# Patient Record
Sex: FEMALE | Race: WHITE | NOT HISPANIC OR LATINO | Employment: FULL TIME | URBAN - METROPOLITAN AREA
[De-identification: names, ages, dates, MRNs, and addresses within clinical notes are randomized per-mention and may not be internally consistent; named-entity substitution may affect disease eponyms.]

---

## 2023-07-20 ENCOUNTER — APPOINTMENT (OUTPATIENT)
Dept: ULTRASOUND IMAGING | Facility: HOSPITAL | Age: 48
DRG: 744 | End: 2023-07-20

## 2023-07-20 ENCOUNTER — APPOINTMENT (OUTPATIENT)
Dept: CT IMAGING | Facility: HOSPITAL | Age: 48
DRG: 744 | End: 2023-07-20

## 2023-07-20 ENCOUNTER — HOSPITAL ENCOUNTER (INPATIENT)
Facility: HOSPITAL | Age: 48
LOS: 4 days | Discharge: HOME | DRG: 744 | End: 2023-07-24
Attending: EMERGENCY MEDICINE | Admitting: STUDENT IN AN ORGANIZED HEALTH CARE EDUCATION/TRAINING PROGRAM

## 2023-07-20 ENCOUNTER — APPOINTMENT (OUTPATIENT)
Dept: GENERAL RADIOLOGY | Facility: HOSPITAL | Age: 48
DRG: 744 | End: 2023-07-20

## 2023-07-20 DIAGNOSIS — D25.9 UTERINE LEIOMYOMA, UNSPECIFIED LOCATION: ICD-10-CM

## 2023-07-20 DIAGNOSIS — E87.20 METABOLIC ACIDOSIS: ICD-10-CM

## 2023-07-20 DIAGNOSIS — N93.8 DYSFUNCTIONAL UTERINE BLEEDING: Primary | ICD-10-CM

## 2023-07-20 DIAGNOSIS — D64.9 SYMPTOMATIC ANEMIA: ICD-10-CM

## 2023-07-20 DIAGNOSIS — F17.200 TOBACCO DEPENDENCE: ICD-10-CM

## 2023-07-20 DIAGNOSIS — N93.9 VAGINAL BLEEDING: ICD-10-CM

## 2023-07-20 DIAGNOSIS — N17.9 ACUTE RENAL FAILURE, UNSPECIFIED ACUTE RENAL FAILURE TYPE: ICD-10-CM

## 2023-07-20 LAB
ABO GROUP BLD: NORMAL
ABO GROUP BLD: NORMAL
ALBUMIN SERPL-MCNC: 3.3 G/DL (ref 3.5–5.2)
ALBUMIN/GLOB SERPL: 1.4 G/DL
ALP SERPL-CCNC: 38 U/L (ref 39–117)
ALT SERPL W P-5'-P-CCNC: 11 U/L (ref 1–33)
ANION GAP SERPL CALCULATED.3IONS-SCNC: 16 MMOL/L (ref 5–15)
ANISOCYTOSIS BLD QL: NORMAL
ARTERIAL PATENCY WRIST A: ABNORMAL
AST SERPL-CCNC: 10 U/L (ref 1–32)
ATMOSPHERIC PRESS: ABNORMAL MM[HG]
BASE EXCESS BLDA CALC-SCNC: -8.2 MMOL/L (ref 0–2)
BASOPHILS # BLD AUTO: 0.01 10*3/MM3 (ref 0–0.2)
BASOPHILS NFR BLD AUTO: 0.1 % (ref 0–1.5)
BDY SITE: ABNORMAL
BILIRUB SERPL-MCNC: <0.2 MG/DL (ref 0–1.2)
BLD GP AB SCN SERPL QL: NEGATIVE
BODY TEMPERATURE: 37 C
BUN BLDA-MCNC: 37 MG/DL
BUN SERPL-MCNC: 32 MG/DL (ref 6–20)
BUN/CREAT SERPL: 7.5 (ref 7–25)
CALCIUM BLD QL: 1.15 MG/DL
CALCIUM SPEC-SCNC: 8.3 MG/DL (ref 8.6–10.5)
CHLORIDE BLDA-SCNC: 107 MMOL/L (ref 98–109)
CHLORIDE SERPL-SCNC: 105 MMOL/L (ref 98–107)
CO2 BLDA-SCNC: 17.5 MMOL/L (ref 22–33)
CO2 SERPL-SCNC: 15 MMOL/L (ref 22–29)
COHGB MFR BLD: 2.6 % (ref 0–2)
CREAT BLDA-MCNC: 5.3 MG/DL
CREAT SERPL-MCNC: 4.26 MG/DL (ref 0.57–1)
DEPRECATED RDW RBC AUTO: 65.6 FL (ref 37–54)
EGFRCR SERPLBLD CKD-EPI 2021: 12.2 ML/MIN/1.73
EOSINOPHIL # BLD AUTO: 0.21 10*3/MM3 (ref 0–0.4)
EOSINOPHIL NFR BLD AUTO: 2 % (ref 0.3–6.2)
EPAP: 0
ERYTHROCYTE [DISTWIDTH] IN BLOOD BY AUTOMATED COUNT: 16.6 % (ref 12.3–15.4)
GLOBULIN UR ELPH-MCNC: 2.3 GM/DL
GLUCOSE BLDC GLUCOMTR-MCNC: 138 MG/DL (ref 70–130)
GLUCOSE SERPL-MCNC: 138 MG/DL (ref 65–99)
HCO3 BLDA-SCNC: 16.6 MMOL/L (ref 20–26)
HCT VFR BLD AUTO: 13.8 % (ref 34–46.6)
HCT VFR BLD CALC: 15.4 % (ref 38–51)
HCT VFR BLDA CALC: -15 % (ref 38–51)
HGB BLD-MCNC: 4.3 G/DL (ref 12–15.9)
HGB BLDA-MCNC: 5 G/DL (ref 14–18)
IMM GRANULOCYTES # BLD AUTO: 0.09 10*3/MM3 (ref 0–0.05)
IMM GRANULOCYTES NFR BLD AUTO: 0.9 % (ref 0–0.5)
INHALED O2 CONCENTRATION: 21 %
INR PPP: 1.11 (ref 0.89–1.12)
IPAP: 0
LIPASE SERPL-CCNC: 59 U/L (ref 13–60)
LYMPHOCYTES # BLD AUTO: 2.21 10*3/MM3 (ref 0.7–3.1)
LYMPHOCYTES NFR BLD AUTO: 21.4 % (ref 19.6–45.3)
Lab: ABNORMAL
MCH RBC QN AUTO: 35 PG (ref 26.6–33)
MCHC RBC AUTO-ENTMCNC: 31.2 G/DL (ref 31.5–35.7)
MCV RBC AUTO: 112.2 FL (ref 79–97)
METHGB BLD QL: ABNORMAL
MODALITY: ABNORMAL
MONOCYTES # BLD AUTO: 0.63 10*3/MM3 (ref 0.1–0.9)
MONOCYTES NFR BLD AUTO: 6.1 % (ref 5–12)
NEUTROPHILS NFR BLD AUTO: 69.5 % (ref 42.7–76)
NEUTROPHILS NFR BLD AUTO: 7.17 10*3/MM3 (ref 1.7–7)
NOTE: ABNORMAL
NOTIFIED BY: ABNORMAL
NOTIFIED WHO: ABNORMAL
NRBC BLD AUTO-RTO: 0.2 /100 WBC (ref 0–0.2)
OXYHGB MFR BLDV: 97.6 % (ref 94–99)
PAW @ PEAK INSP FLOW SETTING VENT: 0 CMH2O
PCO2 BLDA: 29.4 MM HG (ref 35–45)
PCO2 TEMP ADJ BLD: 29.4 MM HG (ref 35–45)
PH BLDA: 7.36 PH UNITS (ref 7.35–7.45)
PH, TEMP CORRECTED: 7.36 PH UNITS
PLAT MORPH BLD: NORMAL
PLATELET # BLD AUTO: 296 10*3/MM3 (ref 140–450)
PMV BLD AUTO: 8.6 FL (ref 6–12)
PO2 BLDA: 84.8 MM HG (ref 83–108)
PO2 TEMP ADJ BLD: 84.8 MM HG (ref 83–108)
POTASSIUM BLDA-SCNC: 3.9 MMOL/L (ref 3.5–4.9)
POTASSIUM SERPL-SCNC: 4 MMOL/L (ref 3.5–5.2)
PROT SERPL-MCNC: 5.6 G/DL (ref 6–8.5)
PROTHROMBIN TIME: 14.5 SECONDS (ref 12.2–14.5)
QT INTERVAL: 364 MS
QTC INTERVAL: 496 MS
RBC # BLD AUTO: 1.23 10*6/MM3 (ref 3.77–5.28)
RH BLD: POSITIVE
RH BLD: POSITIVE
SODIUM BLD-SCNC: 135 MMOL/L (ref 138–146)
SODIUM SERPL-SCNC: 136 MMOL/L (ref 136–145)
T&S EXPIRATION DATE: NORMAL
TOTAL RATE: 0 BREATHS/MINUTE
TROPONIN T SERPL HS-MCNC: 25 NG/L
WBC MORPH BLD: NORMAL
WBC NRBC COR # BLD: 10.32 10*3/MM3 (ref 3.4–10.8)

## 2023-07-20 PROCEDURE — 85025 COMPLETE CBC W/AUTO DIFF WBC: CPT | Performed by: EMERGENCY MEDICINE

## 2023-07-20 PROCEDURE — 85007 BL SMEAR W/DIFF WBC COUNT: CPT | Performed by: EMERGENCY MEDICINE

## 2023-07-20 PROCEDURE — 82728 ASSAY OF FERRITIN: CPT | Performed by: INTERNAL MEDICINE

## 2023-07-20 PROCEDURE — 93005 ELECTROCARDIOGRAM TRACING: CPT | Performed by: EMERGENCY MEDICINE

## 2023-07-20 PROCEDURE — 82375 ASSAY CARBOXYHB QUANT: CPT

## 2023-07-20 PROCEDURE — 36600 WITHDRAWAL OF ARTERIAL BLOOD: CPT

## 2023-07-20 PROCEDURE — 85610 PROTHROMBIN TIME: CPT | Performed by: EMERGENCY MEDICINE

## 2023-07-20 PROCEDURE — 86923 COMPATIBILITY TEST ELECTRIC: CPT

## 2023-07-20 PROCEDURE — 71250 CT THORAX DX C-: CPT

## 2023-07-20 PROCEDURE — 86900 BLOOD TYPING SEROLOGIC ABO: CPT

## 2023-07-20 PROCEDURE — 83050 HGB METHEMOGLOBIN QUAN: CPT

## 2023-07-20 PROCEDURE — 86900 BLOOD TYPING SEROLOGIC ABO: CPT | Performed by: EMERGENCY MEDICINE

## 2023-07-20 PROCEDURE — 83690 ASSAY OF LIPASE: CPT | Performed by: EMERGENCY MEDICINE

## 2023-07-20 PROCEDURE — 84484 ASSAY OF TROPONIN QUANT: CPT | Performed by: EMERGENCY MEDICINE

## 2023-07-20 PROCEDURE — 86901 BLOOD TYPING SEROLOGIC RH(D): CPT | Performed by: EMERGENCY MEDICINE

## 2023-07-20 PROCEDURE — 71045 X-RAY EXAM CHEST 1 VIEW: CPT

## 2023-07-20 PROCEDURE — 86901 BLOOD TYPING SEROLOGIC RH(D): CPT

## 2023-07-20 PROCEDURE — 82805 BLOOD GASES W/O2 SATURATION: CPT

## 2023-07-20 PROCEDURE — 80053 COMPREHEN METABOLIC PANEL: CPT | Performed by: EMERGENCY MEDICINE

## 2023-07-20 PROCEDURE — 25010000002 LORAZEPAM PER 2 MG: Performed by: EMERGENCY MEDICINE

## 2023-07-20 PROCEDURE — 76830 TRANSVAGINAL US NON-OB: CPT

## 2023-07-20 PROCEDURE — 85045 AUTOMATED RETICULOCYTE COUNT: CPT | Performed by: INTERNAL MEDICINE

## 2023-07-20 PROCEDURE — 99285 EMERGENCY DEPT VISIT HI MDM: CPT

## 2023-07-20 PROCEDURE — P9016 RBC LEUKOCYTES REDUCED: HCPCS

## 2023-07-20 PROCEDURE — 36430 TRANSFUSION BLD/BLD COMPNT: CPT

## 2023-07-20 PROCEDURE — 86850 RBC ANTIBODY SCREEN: CPT | Performed by: EMERGENCY MEDICINE

## 2023-07-20 PROCEDURE — 25010000002 ONDANSETRON PER 1 MG: Performed by: EMERGENCY MEDICINE

## 2023-07-20 PROCEDURE — 74176 CT ABD & PELVIS W/O CONTRAST: CPT

## 2023-07-20 RX ORDER — SODIUM CHLORIDE 0.9 % (FLUSH) 0.9 %
10 SYRINGE (ML) INJECTION AS NEEDED
Status: DISCONTINUED | OUTPATIENT
Start: 2023-07-20 | End: 2023-07-21

## 2023-07-20 RX ORDER — NICOTINE 21 MG/24HR
1 PATCH, TRANSDERMAL 24 HOURS TRANSDERMAL
Status: DISCONTINUED | OUTPATIENT
Start: 2023-07-20 | End: 2023-07-21

## 2023-07-20 RX ORDER — MEDROXYPROGESTERONE ACETATE 10 MG/1
30 TABLET ORAL ONCE
Status: COMPLETED | OUTPATIENT
Start: 2023-07-20 | End: 2023-07-21

## 2023-07-20 RX ORDER — ONDANSETRON 2 MG/ML
4 INJECTION INTRAMUSCULAR; INTRAVENOUS ONCE
Status: COMPLETED | OUTPATIENT
Start: 2023-07-20 | End: 2023-07-20

## 2023-07-20 RX ORDER — LORAZEPAM 2 MG/ML
0.5 INJECTION INTRAMUSCULAR ONCE
Status: COMPLETED | OUTPATIENT
Start: 2023-07-20 | End: 2023-07-20

## 2023-07-20 RX ADMIN — ONDANSETRON 4 MG: 2 INJECTION INTRAMUSCULAR; INTRAVENOUS at 20:10

## 2023-07-20 RX ADMIN — Medication 1 PATCH: at 21:21

## 2023-07-20 RX ADMIN — LORAZEPAM 0.5 MG: 2 INJECTION INTRAMUSCULAR; INTRAVENOUS at 20:10

## 2023-07-20 RX ADMIN — SODIUM BICARBONATE 50 MEQ: 84 INJECTION INTRAVENOUS at 21:22

## 2023-07-20 RX ADMIN — SODIUM CHLORIDE 1000 ML: 9 INJECTION, SOLUTION INTRAVENOUS at 20:10

## 2023-07-20 NOTE — ED PROVIDER NOTES
Subjective   History of Present Illness  This is a 47-year-old female that presents the ER with heavy vaginal bleeding for the last 30 days and now patient having significant symptoms of anemia including malaise/fatigue, dyspnea at rest and with any type of exertion, dizziness with standing, and pallor to the skin.  Patient is postmenopausal.  She says that she has not had a period in over a year.  She started having heavy vaginal bleeding 30 days ago.  She was seen at [facility] approximately 2 weeks ago and had a transvaginal ultrasound and was told to follow-up with gynecology.  She has not had a Pap smear in 21 years.  Patient has not followed up yet.  She decided to go to [place] with her family for vacation.  She became so weak and ill that they came straight back and brought her immediately to the hospital.  She reports going through at least 1 pad per hour and passing large clots, the size of a baseball.  She reports some mild suprapubic discomfort and pain all across her lower back.  Past medical history is significant for anxiety and tobacco dependence.  Patient also said that she had some imaging in [facility] that showed a concerning finding of her bladder but she was not sure on the details.    History provided by:  Patient and relative (daughter)  Vaginal Bleeding  Quality:  Bright red and clots (large clots, the size of a baseball.)  Onset quality:  Gradual  Duration:  30 days  Menstrual history:  Irregular  Number of pads used:  10-12  Context comment:  Pt stopped having menses last year.  Started having abnormal vaginal bleeding x 30 days.  Pt had transvaginal u/s last week at [facility] and told no results but to f/u with Gyn.  Last pap smear 21 years ago.  Pt is weak, dizzy with standing, SOA, pale.  Relieved by:  Nothing  Worsened by:  Nothing  Ineffective treatments:  None tried  Associated symptoms: back pain (bilateral lower back pain), dizziness, fatigue and nausea    Associated symptoms:  "no abdominal pain, no dysuria and no fever    Risk factors: no bleeding disorder    Risk factors comment:  Pt hasn't had a pap smear in 21 years.  She \"didn't want to get bad news\"    Review of Systems   Constitutional:  Positive for activity change, appetite change and fatigue. Negative for chills, diaphoresis and fever.   Respiratory:  Positive for shortness of breath (with any exertion).         Positive for tobacco dependence   Cardiovascular:  Positive for leg swelling (trace edema to ankles/feet). Negative for chest pain.   Gastrointestinal:  Positive for nausea. Negative for abdominal pain, constipation and diarrhea.   Genitourinary:  Positive for menstrual problem and vaginal bleeding. Negative for dysuria and flank pain.   Musculoskeletal:  Positive for back pain (bilateral lower back pain).   Skin:  Positive for pallor.   Neurological:  Positive for dizziness and weakness (generalized weakness).   All other systems reviewed and are negative.    No past medical history on file.    No Known Allergies    No past surgical history on file.    No family history on file.    Social History     Socioeconomic History    Marital status:            Objective   Physical Exam  Vitals and nursing note reviewed.   Constitutional:       General: She is not in acute distress.     Appearance: Normal appearance. She is obese. She is ill-appearing. She is not toxic-appearing or diaphoretic.      Comments: Patient appears significantly ill.  Generally weak.  Pallor noted.   HENT:      Head: Normocephalic and atraumatic.      Nose: Nose normal.      Mouth/Throat:      Mouth: Mucous membranes are dry.      Comments: Oral mucous membranes are dry  Eyes:      Extraocular Movements: Extraocular movements intact.      Conjunctiva/sclera: Conjunctivae normal.      Pupils: Pupils are equal, round, and reactive to light.   Cardiovascular:      Rate and Rhythm: Regular rhythm. Tachycardia present. No extrasystoles are present.     " Pulses: Normal pulses.           Dorsalis pedis pulses are 2+ on the right side and 2+ on the left side.        Posterior tibial pulses are 2+ on the right side and 2+ on the left side.      Heart sounds: Normal heart sounds.      Comments: Tachycardic with a heart rate in the 110s.  Trace edema to bilateral ankles and feet.  No erythema or warmth.  Positive distal pulses bilaterally  Pulmonary:      Effort: Tachypnea present. No accessory muscle usage or retractions.      Breath sounds: Normal breath sounds.      Comments: Tachypneic with conversation.  No retractions or accessory muscle use.  Good air exchange to bilateral lung fields.  Abdominal:      General: Bowel sounds are normal. There is no distension.      Palpations: Abdomen is soft.      Tenderness: There is no abdominal tenderness. There is no right CVA tenderness, left CVA tenderness, guarding or rebound.      Comments: Central obesity.  Soft without distention.  Active bowel sounds in all 4 quadrants.  Nontender to palpation.   Genitourinary:     Vagina: Bleeding present.      Comments: Patient has moderate, active bleeding on pelvic exam.  Bimanual exam reveals no enlargement of uterus.  Uterus is nontender.  No palpable adnexal mass or fullness.  Musculoskeletal:         General: Normal range of motion.      Cervical back: Normal range of motion and neck supple.      Right lower leg: Edema present.      Left lower leg: Edema present.   Skin:     General: Skin is warm and dry.      Coloration: Skin is pale.      Findings: No bruising or ecchymosis.      Comments: Pallor noted   Neurological:      General: No focal deficit present.      Mental Status: She is alert and oriented to person, place, and time.      Cranial Nerves: Cranial nerves 2-12 are intact.      Sensory: Sensation is intact.      Motor: Weakness present.      Comments: Generally weak with overall functional decline.  No focal deficits.       Critical Care  Performed by: Myrna Malloy,  NUZHAT  Authorized by: Adebayo Navarro DO     Critical care provider statement:     Critical care time (minutes):  60    Critical care time was exclusive of:  Separately billable procedures and treating other patients    Critical care was necessary to treat or prevent imminent or life-threatening deterioration of the following conditions:  Metabolic crisis and renal failure (Symptomatic anemia with H&H of 4 and 13, acute kidney injury with creatinine of 4.  Heavy irregular vaginal postmenopausal bleeding.  Hypotension.)    Critical care was time spent personally by me on the following activities:  Blood draw for specimens, development of treatment plan with patient or surrogate, discussions with consultants, evaluation of patient's response to treatment, examination of patient, obtaining history from patient or surrogate, re-evaluation of patient's condition, pulse oximetry, ordering and review of radiographic studies, ordering and review of laboratory studies and ordering and performing treatments and interventions           ED Course  ED Course as of 07/20/23 2328   Thu Jul 20, 2023 2012 Concern for significant anemia.  We did POC Chem-8 and H&H was too low to read.  I ordered 3 units of PRBCs and contacted the blood bank to let them know that patient needs type and screen and units of blood as soon as possible.  EKG shows sinus tachycardia.  No acute ST-T wave changes consistent with ischemia.  Chest x-ray reveals no acute cardiopulmonary process and I personally reviewed the chest x-ray.  I discussed the case with Dr. Navarro, ER attending physician [FC]   2049 H&H is 4.3 and 13.8.  Nursing staff advised me and I advised her to call the blood bank and check on units to see if they are almost ready to initiate.  BUN and creatinine are 32 and 4.26.  No previous chemistries for comparison.  Bicarb is 15.  LFTs are within normal limits.  High-sensitivity troponin is 25.  Platelet count is 296.  I updated the  patient and daughter on these results and also discussed results with Dr. Navarro, ER attending physician. [FC]   2303 CT of the chest and abdomen without contrast was performed and we were unable to use contrast due to acute renal failure.  CT of the chest showed pulmonary emphysema and nonspecific subtle centrilobular groundglass nodules in the upper lungs.  Pulmonary nodules up to 7 mm.  Recommend follow-up CT of the chest in 6 months.  Calcific coronary atherosclerosis.  No acute process in the abdomen or pelvis.  Bilateral renal atrophy.  1.3 cm hyperattenuating right renal lesion likely proteinaceous cyst, less likely mass.  Colonic diverticulosis.  Transvaginal ultrasound shows mildly enlarged size of uterus and heterogeneous, thickened, and ill-defined endometrium.  Suspect fibroids.  Grossly normal-appearing right ovary.  3.7 cm left ovarian cyst slightly irregular in outline but largely appearing as a simple cyst.  I paged Dr. Nguyễn, OB/GYN on-call to discuss the case and we also paged Dr. Das, intensivist, to discuss admission. [FC]   2313 Dr. Navarro discussed the case with Dr. Das and he is agreeable to admission in the ICU.  I am awaiting callback from Dr. Nguyễn, for consult on postmenopausal vaginal bleeding.  Vital signs are stable.  We will prepare patient for admission. [FC]   2327 Dr. Nguyễn recommended Provera 30 mg by mouth right now and he will consult patient in the morning in the ICU.  He recommended continued blood transfusions until H&H is stable.  He said that patient will more than likely require a hysterectomy. [FC]      ED Course User Index  [FC] Myrna Malloy PA-C           Recent Results (from the past 24 hour(s))   Comprehensive Metabolic Panel    Collection Time: 07/20/23  7:53 PM    Specimen: Blood   Result Value Ref Range    Glucose 138 (H) 65 - 99 mg/dL    BUN 32 (H) 6 - 20 mg/dL    Creatinine 4.26 (H) 0.57 - 1.00 mg/dL    Sodium 136 136 - 145 mmol/L    Potassium  4.0 3.5 - 5.2 mmol/L    Chloride 105 98 - 107 mmol/L    CO2 15.0 (L) 22.0 - 29.0 mmol/L    Calcium 8.3 (L) 8.6 - 10.5 mg/dL    Total Protein 5.6 (L) 6.0 - 8.5 g/dL    Albumin 3.3 (L) 3.5 - 5.2 g/dL    ALT (SGPT) 11 1 - 33 U/L    AST (SGOT) 10 1 - 32 U/L    Alkaline Phosphatase 38 (L) 39 - 117 U/L    Total Bilirubin <0.2 0.0 - 1.2 mg/dL    Globulin 2.3 gm/dL    A/G Ratio 1.4 g/dL    BUN/Creatinine Ratio 7.5 7.0 - 25.0    Anion Gap 16.0 (H) 5.0 - 15.0 mmol/L    eGFR 12.2 (L) >60.0 mL/min/1.73   Protime-INR    Collection Time: 07/20/23  7:53 PM    Specimen: Blood   Result Value Ref Range    Protime 14.5 12.2 - 14.5 Seconds    INR 1.11 0.89 - 1.12   Lipase    Collection Time: 07/20/23  7:53 PM    Specimen: Blood   Result Value Ref Range    Lipase 59 13 - 60 U/L   Type & Screen    Collection Time: 07/20/23  7:53 PM    Specimen: Blood   Result Value Ref Range    ABO Type O     RH type Positive     Antibody Screen Negative     T&S Expiration Date 7/23/2023 11:59:59 PM    Single High Sensitivity Troponin T    Collection Time: 07/20/23  7:53 PM    Specimen: Blood   Result Value Ref Range    HS Troponin T 25 (H) <10 ng/L   CBC Auto Differential    Collection Time: 07/20/23  7:53 PM    Specimen: Blood   Result Value Ref Range    WBC 10.32 3.40 - 10.80 10*3/mm3    RBC 1.23 (L) 3.77 - 5.28 10*6/mm3    Hemoglobin 4.3 (C) 12.0 - 15.9 g/dL    Hematocrit 13.8 (C) 34.0 - 46.6 %    .2 (H) 79.0 - 97.0 fL    MCH 35.0 (H) 26.6 - 33.0 pg    MCHC 31.2 (L) 31.5 - 35.7 g/dL    RDW 16.6 (H) 12.3 - 15.4 %    RDW-SD 65.6 (H) 37.0 - 54.0 fl    MPV 8.6 6.0 - 12.0 fL    Platelets 296 140 - 450 10*3/mm3    Neutrophil % 69.5 42.7 - 76.0 %    Lymphocyte % 21.4 19.6 - 45.3 %    Monocyte % 6.1 5.0 - 12.0 %    Eosinophil % 2.0 0.3 - 6.2 %    Basophil % 0.1 0.0 - 1.5 %    Immature Grans % 0.9 (H) 0.0 - 0.5 %    Neutrophils, Absolute 7.17 (H) 1.70 - 7.00 10*3/mm3    Lymphocytes, Absolute 2.21 0.70 - 3.10 10*3/mm3    Monocytes, Absolute 0.63 0.10 -  0.90 10*3/mm3    Eosinophils, Absolute 0.21 0.00 - 0.40 10*3/mm3    Basophils, Absolute 0.01 0.00 - 0.20 10*3/mm3    Immature Grans, Absolute 0.09 (H) 0.00 - 0.05 10*3/mm3    nRBC 0.2 0.0 - 0.2 /100 WBC   Scan Slide    Collection Time: 07/20/23  7:53 PM    Specimen: Blood   Result Value Ref Range    Anisocytosis Mod/2+ None Seen    WBC Morphology Normal Normal    Platelet Morphology Normal Normal   POC Chem 8    Collection Time: 07/20/23  7:59 PM    Specimen: Blood   Result Value Ref Range    Sodium 135 (A) 138 - 146 mmol/L    POC Potassium 3.9 3.5 - 4.9 mmol/L    Chloride 107 98 - 109 mmol/L    Calcium, Arterial 1.15 mg/dL    Glucose 138 (A) 70 - 130 mg/dL    BUN 37 mg/dL    Creatinine 5.30 mg/dL    Hematocrit -15 (A) 38 - 51 %   ECG 12 Lead Dyspnea    Collection Time: 07/20/23  8:05 PM   Result Value Ref Range    QT Interval 364 ms    QTC Interval 496 ms   ABO RH Specimen Verification    Collection Time: 07/20/23  8:10 PM    Specimen: Blood   Result Value Ref Range    ABO Type O     RH type Positive    Prepare RBC, 3 Units    Collection Time: 07/20/23  9:06 PM   Result Value Ref Range    Product Code D9167S32     Unit Number [id number]     UNIT  ABO O     UNIT  RH POS     Crossmatch Interpretation Compatible     Dispense Status IS     Blood Expiration Date 202308082359     Blood Type Barcode 5100     Product Code T2618M80     Unit Number [id number]     UNIT  ABO O     UNIT  RH POS     Crossmatch Interpretation Compatible     Dispense Status XM     Blood Expiration Date 202308082359     Blood Type Barcode 5100     Product Code P6778J92     Unit Number [id number]     UNIT  ABO O     UNIT  RH POS     Crossmatch Interpretation Compatible     Dispense Status XM     Blood Expiration Date 202308082359     Blood Type Barcode 5100    Blood Gas, Arterial With Co-Ox    Collection Time: 07/20/23 11:07 PM    Specimen: Arterial Blood   Result Value Ref Range    Site Left Radial     Jah's Test N/A     pH,  Arterial 7.360 7.350 - 7.450 pH units    pCO2, Arterial 29.4 (L) 35.0 - 45.0 mm Hg    pO2, Arterial 84.8 83.0 - 108.0 mm Hg    HCO3, Arterial 16.6 (L) 20.0 - 26.0 mmol/L    Base Excess, Arterial -8.2 (L) 0.0 - 2.0 mmol/L    Hemoglobin, Blood Gas 5.0 (C) 14 - 18 g/dL    Hematocrit, Blood Gas 15.4 (L) 38.0 - 51.0 %    Oxyhemoglobin 97.6 94 - 99 %    Methemoglobin      Carboxyhemoglobin 2.6 (H) 0 - 2 %    CO2 Content 17.5 (L) 22 - 33 mmol/L    Temperature 37.0 C    Barometric Pressure for Blood Gas      Modality Room Air     FIO2 21 %    Rate 0 Breaths/minute    PIP 0 cmH2O    IPAP 0     EPAP 0     Note      Notified ALAINA Cristobal     Notified By [id number]     Notified Time 07/20/2023 23:11     pH, Temp Corrected 7.360 pH Units    pCO2, Temperature Corrected 29.4 (L) 35 - 45 mm Hg    pO2, Temperature Corrected 84.8 83 - 108 mm Hg     Note: In addition to lab results from this visit, the labs listed above may include labs taken at another facility or during a different encounter within the last 24 hours. Please correlate lab times with ED admission and discharge times for further clarification of the services performed during this visit.    US Non-ob Transvaginal   Final Result   Impression:      1. Technically limited exam, but with prominent, perhaps mildly enlarged size of the uterus, and heterogeneous, thickened and ill-defined endometrium. Suspected fibroids, not clearly visualized on this exam. Consider evaluation by gynecology service. If    the patient is unable to tolerate further ultrasound scanning, pelvic MRI may be helpful at some point.      2. Grossly normal-appearing right ovary.      3. 3.7 cm left ovarian cyst, slightly irregular in outline, but largely appearing as a simple cyst.      Electronically Signed: Victor Hugo Valdes     7/20/2023 10:57 PM EDT     Workstation ID: [id number]      CT Abdomen Pelvis Without Contrast   Final Result      Chest-   1. Pulmonary emphysema   2. Nonspecific subtle centrilobular  groundglass nodules in the upper lungs. Respiratory bronchiolitis is a primary consideration   3. Pulmonary nodules up to 7 mm. Recommend follow-up chest CT in 6 months   4. Calcific coronary atherosclerosis      Abdomen/pelvis-   1. No acute process. No obstructive uropathy   2. Bilateral renal atrophy   3. 1.3 cm hyperattenuating right renal lesion, likely proteinaceous cyst, less likely mass. Further evaluation with contrast-enhanced MRI or CT should be considered   4. Colonic diverticulosis                  Electronically Signed: Tacos Collier     7/20/2023 8:59 PM EDT     Workstation ID: [id number]      CT Chest Without Contrast Diagnostic   Final Result      Chest-   1. Pulmonary emphysema   2. Nonspecific subtle centrilobular groundglass nodules in the upper lungs. Respiratory bronchiolitis is a primary consideration   3. Pulmonary nodules up to 7 mm. Recommend follow-up chest CT in 6 months   4. Calcific coronary atherosclerosis      Abdomen/pelvis-   1. No acute process. No obstructive uropathy   2. Bilateral renal atrophy   3. 1.3 cm hyperattenuating right renal lesion, likely proteinaceous cyst, less likely mass. Further evaluation with contrast-enhanced MRI or CT should be considered   4. Colonic diverticulosis                  Electronically Signed: Tacos Collier     7/20/2023 8:59 PM EDT     Workstation ID: [id number]      XR Chest 1 View   Final Result   Impression:   No acute cardiopulmonary findings.         Electronically Signed: David Sarmiento     7/20/2023 7:47 PM EDT     Workstation ID: [id number]        Vitals:    07/20/23 2233 07/20/23 2245 07/20/23 2300 07/20/23 2315   BP: 117/52 121/64 111/61 130/68   BP Location:       Patient Position:       Pulse: 113 113 112 107   Resp:       Temp:       SpO2: 100% 99% 94% 100%   Weight:       Height:         Medications   sodium chloride 0.9 % flush 10 mL (has no administration in time range)   nicotine (NICODERM CQ) 21 MG/24HR patch 1 patch (1 patch  Transdermal Medication Applied 7/20/23 2121)   medroxyPROGESTERone (PROVERA) tablet 30 mg (has no administration in time range)   sodium chloride 0.9 % bolus 1,000 mL (0 mL Intravenous Stopped 7/20/23 2128)   LORazepam (ATIVAN) injection 0.5 mg (0.5 mg Intravenous Given 7/20/23 2010)   ondansetron (ZOFRAN) injection 4 mg (4 mg Intravenous Given 7/20/23 2010)   sodium bicarbonate injection 8.4% 50 mEq (50 mEq Intravenous Given 7/20/23 2122)     ECG/EMG Results (last 24 hours)       ** No results found for the last 24 hours. **          ECG 12 Lead Dyspnea   Final Result   Test Reason : Dyspnea   Blood Pressure :   */*   mmHG   Vent. Rate : 112 BPM     Atrial Rate : 112 BPM      P-R Int : 128 ms          QRS Dur :  68 ms       QT Int : 364 ms       P-R-T Axes :  57  32  54 degrees      QTc Int : 496 ms      Sinus tachycardia   Otherwise normal ECG   No previous ECGs available   Confirmed by GLORY JUARES MD ([id number]) on 7/20/2023 8:10:55 PM      Referred By: EDMD           Confirmed By: GLORY JUARES MD                                            Medical Decision Making  Problems Addressed:  Acute renal failure, unspecified acute renal failure type: complicated acute illness or injury  Dysfunctional uterine bleeding: complicated acute illness or injury  Metabolic acidosis: complicated acute illness or injury  Symptomatic anemia: complicated acute illness or injury  Tobacco dependence: complicated acute illness or injury  Uterine leiomyoma, unspecified location: complicated acute illness or injury    Amount and/or Complexity of Data Reviewed  Labs: ordered.  Radiology: ordered.  ECG/medicine tests: ordered.    Risk  OTC drugs.  Prescription drug management.  Decision regarding hospitalization.        Final diagnoses:   Dysfunctional uterine bleeding   Symptomatic anemia   Acute renal failure, unspecified acute renal failure type   Metabolic acidosis   Uterine leiomyoma, unspecified location   Tobacco dependence       ED  Disposition  ED Disposition       ED Disposition   Decision to Admit    Condition   --    Comment   Level of Care: Critical Care [6]   Admitting Physician: KELECHI LONG [[id number]]                 No follow-up provider specified.       Medication List      No changes were made to your prescriptions during this visit.            Myrna Malloy PA-C  07/20/23 7636       Myrna Malloy PA-C  07/20/23 4528

## 2023-07-21 ENCOUNTER — ANESTHESIA EVENT (OUTPATIENT)
Dept: PERIOP | Facility: HOSPITAL | Age: 48
DRG: 744 | End: 2023-07-21

## 2023-07-21 ENCOUNTER — ANESTHESIA (OUTPATIENT)
Dept: PERIOP | Facility: HOSPITAL | Age: 48
DRG: 744 | End: 2023-07-21

## 2023-07-21 ENCOUNTER — APPOINTMENT (OUTPATIENT)
Dept: CARDIOLOGY | Facility: HOSPITAL | Age: 48
DRG: 744 | End: 2023-07-21

## 2023-07-21 PROBLEM — D64.9 ANEMIA: Status: ACTIVE | Noted: 2023-07-21

## 2023-07-21 LAB
ALBUMIN SERPL-MCNC: 2.6 G/DL (ref 3.5–5.2)
ALBUMIN/GLOB SERPL: 1.6 G/DL
ALP SERPL-CCNC: 30 U/L (ref 39–117)
ALT SERPL W P-5'-P-CCNC: 8 U/L (ref 1–33)
AMPHET+METHAMPHET UR QL: NEGATIVE
AMPHETAMINES UR QL: NEGATIVE
ANION GAP SERPL CALCULATED.3IONS-SCNC: 14 MMOL/L (ref 5–15)
AST SERPL-CCNC: 8 U/L (ref 1–32)
B-HCG UR QL: NEGATIVE
BACTERIA UR QL AUTO: NORMAL /HPF
BARBITURATES UR QL SCN: NEGATIVE
BASOPHILS # BLD AUTO: 0.03 10*3/MM3 (ref 0–0.2)
BASOPHILS NFR BLD AUTO: 0.4 % (ref 0–1.5)
BENZODIAZ UR QL SCN: POSITIVE
BH CV ECHO MEAS - AO MAX PG: 12.5 MMHG
BH CV ECHO MEAS - AO MEAN PG: 6 MMHG
BH CV ECHO MEAS - AO ROOT DIAM: 2.6 CM
BH CV ECHO MEAS - AO V2 MAX: 177 CM/SEC
BH CV ECHO MEAS - AO V2 VTI: 38.4 CM
BH CV ECHO MEAS - AVA(I,D): 2.08 CM2
BH CV ECHO MEAS - EDV(CUBED): 97.3 ML
BH CV ECHO MEAS - EDV(MOD-SP2): 81 ML
BH CV ECHO MEAS - EDV(MOD-SP4): 90.6 ML
BH CV ECHO MEAS - EF(MOD-SP2): 62.5 %
BH CV ECHO MEAS - EF(MOD-SP4): 72.6 %
BH CV ECHO MEAS - ESV(CUBED): 32.8 ML
BH CV ECHO MEAS - ESV(MOD-SP2): 30.4 ML
BH CV ECHO MEAS - ESV(MOD-SP4): 24.8 ML
BH CV ECHO MEAS - FS: 30.4 %
BH CV ECHO MEAS - IVS/LVPW: 0.8 CM
BH CV ECHO MEAS - IVSD: 0.8 CM
BH CV ECHO MEAS - LA DIMENSION: 3 CM
BH CV ECHO MEAS - LAT PEAK E' VEL: 12.4 CM/SEC
BH CV ECHO MEAS - LV MASS(C)D: 137.7 GRAMS
BH CV ECHO MEAS - LV MAX PG: 7.3 MMHG
BH CV ECHO MEAS - LV MEAN PG: 4 MMHG
BH CV ECHO MEAS - LV V1 MAX: 135 CM/SEC
BH CV ECHO MEAS - LV V1 VTI: 25.4 CM
BH CV ECHO MEAS - LVIDD: 4.6 CM
BH CV ECHO MEAS - LVIDS: 3.2 CM
BH CV ECHO MEAS - LVOT AREA: 3.1 CM2
BH CV ECHO MEAS - LVOT DIAM: 2 CM
BH CV ECHO MEAS - LVPWD: 1 CM
BH CV ECHO MEAS - MED PEAK E' VEL: 8.8 CM/SEC
BH CV ECHO MEAS - MV A MAX VEL: 116 CM/SEC
BH CV ECHO MEAS - MV DEC SLOPE: 340 CM/SEC2
BH CV ECHO MEAS - MV DEC TIME: 0.26 MSEC
BH CV ECHO MEAS - MV E MAX VEL: 89.6 CM/SEC
BH CV ECHO MEAS - MV E/A: 0.77
BH CV ECHO MEAS - MV MAX PG: 8.2 MMHG
BH CV ECHO MEAS - MV MEAN PG: 3 MMHG
BH CV ECHO MEAS - MV V2 VTI: 32.8 CM
BH CV ECHO MEAS - MVA(VTI): 2.43 CM2
BH CV ECHO MEAS - PA ACC TIME: 0.17 SEC
BH CV ECHO MEAS - PA V2 MAX: 108.9 CM/SEC
BH CV ECHO MEAS - SV(LVOT): 79.8 ML
BH CV ECHO MEAS - SV(MOD-SP2): 50.6 ML
BH CV ECHO MEAS - SV(MOD-SP4): 65.8 ML
BH CV ECHO MEAS - TAPSE (>1.6): 2.43 CM
BH CV ECHO MEASUREMENTS AVERAGE E/E' RATIO: 8.45
BH CV VAS BP RIGHT ARM: NORMAL MMHG
BH CV XLRA - RV BASE: 2.5 CM
BH CV XLRA - RV LENGTH: 6.6 CM
BH CV XLRA - RV MID: 2.2 CM
BH CV XLRA - TDI S': 17.6 CM/SEC
BILIRUB SERPL-MCNC: 0.4 MG/DL (ref 0–1.2)
BILIRUB UR QL STRIP: NEGATIVE
BUN SERPL-MCNC: 33 MG/DL (ref 6–20)
BUN/CREAT SERPL: 7.6 (ref 7–25)
BUPRENORPHINE SERPL-MCNC: NEGATIVE NG/ML
CALCIUM SPEC-SCNC: 7.2 MG/DL (ref 8.6–10.5)
CANNABINOIDS SERPL QL: NEGATIVE
CHLORIDE SERPL-SCNC: 106 MMOL/L (ref 98–107)
CLARITY UR: CLEAR
CO2 SERPL-SCNC: 17 MMOL/L (ref 22–29)
COCAINE UR QL: NEGATIVE
COLOR UR: YELLOW
CREAT SERPL-MCNC: 4.35 MG/DL (ref 0.57–1)
CREAT UR-MCNC: 114.9 MG/DL
DEPRECATED RDW RBC AUTO: 63.9 FL (ref 37–54)
EGFRCR SERPLBLD CKD-EPI 2021: 11.9 ML/MIN/1.73
EOSINOPHIL # BLD AUTO: 0.2 10*3/MM3 (ref 0–0.4)
EOSINOPHIL NFR BLD AUTO: 2.5 % (ref 0.3–6.2)
EOSINOPHIL SPEC QL MICRO: 0 % EOS/100 CELLS (ref 0–0)
ERYTHROCYTE [DISTWIDTH] IN BLOOD BY AUTOMATED COUNT: 20.2 % (ref 12.3–15.4)
FERRITIN SERPL-MCNC: 29.49 NG/ML (ref 13–150)
FOLATE SERPL-MCNC: 4.06 NG/ML (ref 4.78–24.2)
GLOBULIN UR ELPH-MCNC: 1.6 GM/DL
GLUCOSE SERPL-MCNC: 130 MG/DL (ref 65–99)
GLUCOSE UR STRIP-MCNC: ABNORMAL MG/DL
HCT VFR BLD AUTO: 20.4 % (ref 34–46.6)
HCT VFR BLD AUTO: 32.8 % (ref 34–46.6)
HGB BLD-MCNC: 11.1 G/DL (ref 12–15.9)
HGB BLD-MCNC: 6.8 G/DL (ref 12–15.9)
HGB UR QL STRIP.AUTO: ABNORMAL
HYALINE CASTS UR QL AUTO: NORMAL /LPF
IMM GRANULOCYTES # BLD AUTO: 0.08 10*3/MM3 (ref 0–0.05)
IMM GRANULOCYTES NFR BLD AUTO: 1 % (ref 0–0.5)
IRON 24H UR-MRATE: 120 MCG/DL (ref 37–145)
IRON SATN MFR SERPL: 44 % (ref 20–50)
KETONES UR QL STRIP: NEGATIVE
LEFT ATRIUM VOLUME INDEX: 17.6 ML/M2
LEUKOCYTE ESTERASE UR QL STRIP.AUTO: NEGATIVE
LYMPHOCYTES # BLD AUTO: 2.16 10*3/MM3 (ref 0.7–3.1)
LYMPHOCYTES NFR BLD AUTO: 26.5 % (ref 19.6–45.3)
MAGNESIUM SERPL-MCNC: 1.9 MG/DL (ref 1.6–2.6)
MCH RBC QN AUTO: 30.5 PG (ref 26.6–33)
MCHC RBC AUTO-ENTMCNC: 33.3 G/DL (ref 31.5–35.7)
MCV RBC AUTO: 91.5 FL (ref 79–97)
METHADONE UR QL SCN: NEGATIVE
MONOCYTES # BLD AUTO: 0.52 10*3/MM3 (ref 0.1–0.9)
MONOCYTES NFR BLD AUTO: 6.4 % (ref 5–12)
NEUTROPHILS NFR BLD AUTO: 5.15 10*3/MM3 (ref 1.7–7)
NEUTROPHILS NFR BLD AUTO: 63.2 % (ref 42.7–76)
NITRITE UR QL STRIP: NEGATIVE
NRBC BLD AUTO-RTO: 0.4 /100 WBC (ref 0–0.2)
OPIATES UR QL: NEGATIVE
OXYCODONE UR QL SCN: NEGATIVE
PCP UR QL SCN: NEGATIVE
PH UR STRIP.AUTO: 5.5 [PH] (ref 5–8)
PHOSPHATE SERPL-MCNC: 6.1 MG/DL (ref 2.5–4.5)
PLATELET # BLD AUTO: 201 10*3/MM3 (ref 140–450)
PMV BLD AUTO: 8.5 FL (ref 6–12)
POTASSIUM SERPL-SCNC: 4.1 MMOL/L (ref 3.5–5.2)
PROPOXYPH UR QL: NEGATIVE
PROT ?TM UR-MCNC: 54.6 MG/DL
PROT SERPL-MCNC: 4.2 G/DL (ref 6–8.5)
PROT UR QL STRIP: ABNORMAL
RBC # BLD AUTO: 2.23 10*6/MM3 (ref 3.77–5.28)
RBC # UR STRIP: NORMAL /HPF
REF LAB TEST METHOD: NORMAL
RENAL EPI CELLS #/AREA URNS HPF: NORMAL /HPF
RETICS # AUTO: 0.14 10*6/MM3 (ref 0.02–0.13)
RETICS/RBC NFR AUTO: 11.24 % (ref 0.7–1.9)
SODIUM SERPL-SCNC: 137 MMOL/L (ref 136–145)
SODIUM UR-SCNC: 42 MMOL/L
SP GR UR STRIP: 1.01 (ref 1–1.03)
SQUAMOUS #/AREA URNS HPF: NORMAL /HPF
TIBC SERPL-MCNC: 276 MCG/DL (ref 298–536)
TRANS CELLS #/AREA URNS HPF: NORMAL /HPF
TRANSFERRIN SERPL-MCNC: 185 MG/DL (ref 200–360)
TRICYCLICS UR QL SCN: NEGATIVE
TSH SERPL DL<=0.05 MIU/L-ACNC: 1.27 UIU/ML (ref 0.27–4.2)
UROBILINOGEN UR QL STRIP: ABNORMAL
VIT B12 BLD-MCNC: 355 PG/ML (ref 211–946)
WBC # UR STRIP: NORMAL /HPF
WBC NRBC COR # BLD: 8.14 10*3/MM3 (ref 3.4–10.8)

## 2023-07-21 PROCEDURE — 86900 BLOOD TYPING SEROLOGIC ABO: CPT

## 2023-07-21 PROCEDURE — 58120 DILATION AND CURETTAGE: CPT | Performed by: OBSTETRICS & GYNECOLOGY

## 2023-07-21 PROCEDURE — 84466 ASSAY OF TRANSFERRIN: CPT | Performed by: EMERGENCY MEDICINE

## 2023-07-21 PROCEDURE — 25010000002 ONDANSETRON PER 1 MG: Performed by: NURSE ANESTHETIST, CERTIFIED REGISTERED

## 2023-07-21 PROCEDURE — 85014 HEMATOCRIT: CPT

## 2023-07-21 PROCEDURE — 80053 COMPREHEN METABOLIC PANEL: CPT | Performed by: INTERNAL MEDICINE

## 2023-07-21 PROCEDURE — 85018 HEMOGLOBIN: CPT

## 2023-07-21 PROCEDURE — 82570 ASSAY OF URINE CREATININE: CPT | Performed by: INTERNAL MEDICINE

## 2023-07-21 PROCEDURE — 0UDB7ZZ EXTRACTION OF ENDOMETRIUM, VIA NATURAL OR ARTIFICIAL OPENING: ICD-10-PCS | Performed by: OBSTETRICS & GYNECOLOGY

## 2023-07-21 PROCEDURE — 99223 1ST HOSP IP/OBS HIGH 75: CPT | Performed by: INTERNAL MEDICINE

## 2023-07-21 PROCEDURE — 99253 IP/OBS CNSLTJ NEW/EST LOW 45: CPT | Performed by: OBSTETRICS & GYNECOLOGY

## 2023-07-21 PROCEDURE — 85025 COMPLETE CBC W/AUTO DIFF WBC: CPT | Performed by: INTERNAL MEDICINE

## 2023-07-21 PROCEDURE — 83540 ASSAY OF IRON: CPT | Performed by: EMERGENCY MEDICINE

## 2023-07-21 PROCEDURE — 84100 ASSAY OF PHOSPHORUS: CPT | Performed by: INTERNAL MEDICINE

## 2023-07-21 PROCEDURE — 84443 ASSAY THYROID STIM HORMONE: CPT | Performed by: INTERNAL MEDICINE

## 2023-07-21 PROCEDURE — 57500 BIOPSY OF CERVIX: CPT | Performed by: OBSTETRICS & GYNECOLOGY

## 2023-07-21 PROCEDURE — 93306 TTE W/DOPPLER COMPLETE: CPT | Performed by: INTERNAL MEDICINE

## 2023-07-21 PROCEDURE — 87205 SMEAR GRAM STAIN: CPT | Performed by: INTERNAL MEDICINE

## 2023-07-21 PROCEDURE — 0UBC7ZX EXCISION OF CERVIX, VIA NATURAL OR ARTIFICIAL OPENING, DIAGNOSTIC: ICD-10-PCS | Performed by: OBSTETRICS & GYNECOLOGY

## 2023-07-21 PROCEDURE — 84156 ASSAY OF PROTEIN URINE: CPT | Performed by: INTERNAL MEDICINE

## 2023-07-21 PROCEDURE — 81001 URINALYSIS AUTO W/SCOPE: CPT | Performed by: EMERGENCY MEDICINE

## 2023-07-21 PROCEDURE — 93306 TTE W/DOPPLER COMPLETE: CPT

## 2023-07-21 PROCEDURE — 25010000002 PROPOFOL 10 MG/ML EMULSION: Performed by: NURSE ANESTHETIST, CERTIFIED REGISTERED

## 2023-07-21 PROCEDURE — 80306 DRUG TEST PRSMV INSTRMNT: CPT | Performed by: NURSE PRACTITIONER

## 2023-07-21 PROCEDURE — 83735 ASSAY OF MAGNESIUM: CPT | Performed by: INTERNAL MEDICINE

## 2023-07-21 PROCEDURE — 81025 URINE PREGNANCY TEST: CPT | Performed by: NURSE PRACTITIONER

## 2023-07-21 PROCEDURE — 25010000002 SUGAMMADEX 200 MG/2ML SOLUTION: Performed by: NURSE ANESTHETIST, CERTIFIED REGISTERED

## 2023-07-21 PROCEDURE — 88305 TISSUE EXAM BY PATHOLOGIST: CPT | Performed by: OBSTETRICS & GYNECOLOGY

## 2023-07-21 PROCEDURE — P9016 RBC LEUKOCYTES REDUCED: HCPCS

## 2023-07-21 PROCEDURE — 82746 ASSAY OF FOLIC ACID SERUM: CPT | Performed by: INTERNAL MEDICINE

## 2023-07-21 PROCEDURE — 36430 TRANSFUSION BLD/BLD COMPNT: CPT

## 2023-07-21 PROCEDURE — 25010000002 FENTANYL CITRATE (PF) 100 MCG/2ML SOLUTION: Performed by: NURSE ANESTHETIST, CERTIFIED REGISTERED

## 2023-07-21 PROCEDURE — 25010000002 CEFAZOLIN IN DEXTROSE 2000 MG/ 100 ML SOLUTION: Performed by: OBSTETRICS & GYNECOLOGY

## 2023-07-21 PROCEDURE — P9040 RBC LEUKOREDUCED IRRADIATED: HCPCS

## 2023-07-21 PROCEDURE — 84300 ASSAY OF URINE SODIUM: CPT | Performed by: INTERNAL MEDICINE

## 2023-07-21 PROCEDURE — 25010000002 DEXAMETHASONE PER 1 MG: Performed by: NURSE ANESTHETIST, CERTIFIED REGISTERED

## 2023-07-21 PROCEDURE — 82607 VITAMIN B-12: CPT | Performed by: INTERNAL MEDICINE

## 2023-07-21 RX ORDER — PROMETHAZINE HYDROCHLORIDE 25 MG/1
25 SUPPOSITORY RECTAL ONCE AS NEEDED
Status: DISCONTINUED | OUTPATIENT
Start: 2023-07-21 | End: 2023-07-21 | Stop reason: HOSPADM

## 2023-07-21 RX ORDER — SODIUM CHLORIDE 0.9 % (FLUSH) 0.9 %
3-10 SYRINGE (ML) INJECTION AS NEEDED
Status: DISCONTINUED | OUTPATIENT
Start: 2023-07-21 | End: 2023-07-21 | Stop reason: HOSPADM

## 2023-07-21 RX ORDER — SODIUM CHLORIDE 0.9 % (FLUSH) 0.9 %
3 SYRINGE (ML) INJECTION EVERY 12 HOURS SCHEDULED
Status: DISCONTINUED | OUTPATIENT
Start: 2023-07-21 | End: 2023-07-21 | Stop reason: HOSPADM

## 2023-07-21 RX ORDER — SODIUM CHLORIDE 9 MG/ML
INJECTION, SOLUTION INTRAVENOUS CONTINUOUS PRN
Status: DISCONTINUED | OUTPATIENT
Start: 2023-07-21 | End: 2023-07-21 | Stop reason: SURG

## 2023-07-21 RX ORDER — CEFAZOLIN SODIUM 2 G/100ML
2000 INJECTION, SOLUTION INTRAVENOUS ONCE
Status: COMPLETED | OUTPATIENT
Start: 2023-07-21 | End: 2023-07-21

## 2023-07-21 RX ORDER — LABETALOL HYDROCHLORIDE 5 MG/ML
5 INJECTION, SOLUTION INTRAVENOUS
Status: DISCONTINUED | OUTPATIENT
Start: 2023-07-21 | End: 2023-07-21 | Stop reason: HOSPADM

## 2023-07-21 RX ORDER — SODIUM CHLORIDE 0.9 % (FLUSH) 0.9 %
10 SYRINGE (ML) INJECTION EVERY 12 HOURS SCHEDULED
Status: DISCONTINUED | OUTPATIENT
Start: 2023-07-21 | End: 2023-07-21 | Stop reason: HOSPADM

## 2023-07-21 RX ORDER — SODIUM CHLORIDE 9 MG/ML
40 INJECTION, SOLUTION INTRAVENOUS AS NEEDED
Status: DISCONTINUED | OUTPATIENT
Start: 2023-07-21 | End: 2023-07-21

## 2023-07-21 RX ORDER — HYDROCODONE BITARTRATE AND ACETAMINOPHEN 5; 325 MG/1; MG/1
1 TABLET ORAL ONCE
Status: COMPLETED | OUTPATIENT
Start: 2023-07-21 | End: 2023-07-21

## 2023-07-21 RX ORDER — DEXAMETHASONE SODIUM PHOSPHATE 4 MG/ML
INJECTION, SOLUTION INTRA-ARTICULAR; INTRALESIONAL; INTRAMUSCULAR; INTRAVENOUS; SOFT TISSUE AS NEEDED
Status: DISCONTINUED | OUTPATIENT
Start: 2023-07-21 | End: 2023-07-21 | Stop reason: SURG

## 2023-07-21 RX ORDER — HYDRALAZINE HYDROCHLORIDE 20 MG/ML
5 INJECTION INTRAMUSCULAR; INTRAVENOUS
Status: DISCONTINUED | OUTPATIENT
Start: 2023-07-21 | End: 2023-07-21 | Stop reason: HOSPADM

## 2023-07-21 RX ORDER — IPRATROPIUM BROMIDE AND ALBUTEROL SULFATE 2.5; .5 MG/3ML; MG/3ML
3 SOLUTION RESPIRATORY (INHALATION) ONCE AS NEEDED
Status: DISCONTINUED | OUTPATIENT
Start: 2023-07-21 | End: 2023-07-21 | Stop reason: HOSPADM

## 2023-07-21 RX ORDER — MIDAZOLAM HYDROCHLORIDE 1 MG/ML
1 INJECTION INTRAMUSCULAR; INTRAVENOUS
Status: DISCONTINUED | OUTPATIENT
Start: 2023-07-21 | End: 2023-07-21 | Stop reason: HOSPADM

## 2023-07-21 RX ORDER — FAMOTIDINE 20 MG/1
20 TABLET, FILM COATED ORAL ONCE
Status: COMPLETED | OUTPATIENT
Start: 2023-07-21 | End: 2023-07-21

## 2023-07-21 RX ORDER — ACETAMINOPHEN 325 MG/1
650 TABLET ORAL EVERY 6 HOURS PRN
Status: DISCONTINUED | OUTPATIENT
Start: 2023-07-21 | End: 2023-07-24 | Stop reason: HOSPADM

## 2023-07-21 RX ORDER — DROPERIDOL 2.5 MG/ML
0.62 INJECTION, SOLUTION INTRAMUSCULAR; INTRAVENOUS
Status: DISCONTINUED | OUTPATIENT
Start: 2023-07-21 | End: 2023-07-21 | Stop reason: HOSPADM

## 2023-07-21 RX ORDER — ONDANSETRON 2 MG/ML
4 INJECTION INTRAMUSCULAR; INTRAVENOUS ONCE AS NEEDED
Status: DISCONTINUED | OUTPATIENT
Start: 2023-07-21 | End: 2023-07-21 | Stop reason: HOSPADM

## 2023-07-21 RX ORDER — FENTANYL CITRATE 50 UG/ML
INJECTION, SOLUTION INTRAMUSCULAR; INTRAVENOUS AS NEEDED
Status: DISCONTINUED | OUTPATIENT
Start: 2023-07-21 | End: 2023-07-21 | Stop reason: SURG

## 2023-07-21 RX ORDER — ALPRAZOLAM 0.25 MG/1
0.25 TABLET ORAL 3 TIMES DAILY PRN
Status: DISCONTINUED | OUTPATIENT
Start: 2023-07-21 | End: 2023-07-21

## 2023-07-21 RX ORDER — PROPOFOL 10 MG/ML
VIAL (ML) INTRAVENOUS AS NEEDED
Status: DISCONTINUED | OUTPATIENT
Start: 2023-07-21 | End: 2023-07-21 | Stop reason: SURG

## 2023-07-21 RX ORDER — PROMETHAZINE HYDROCHLORIDE 25 MG/1
25 TABLET ORAL ONCE AS NEEDED
Status: DISCONTINUED | OUTPATIENT
Start: 2023-07-21 | End: 2023-07-21 | Stop reason: HOSPADM

## 2023-07-21 RX ORDER — LIDOCAINE 50 MG/G
1 PATCH TOPICAL
Status: DISCONTINUED | OUTPATIENT
Start: 2023-07-21 | End: 2023-07-24 | Stop reason: HOSPADM

## 2023-07-21 RX ORDER — NALOXONE HCL 0.4 MG/ML
0.4 VIAL (ML) INJECTION AS NEEDED
Status: DISCONTINUED | OUTPATIENT
Start: 2023-07-21 | End: 2023-07-21 | Stop reason: HOSPADM

## 2023-07-21 RX ORDER — ONDANSETRON 2 MG/ML
4 INJECTION INTRAMUSCULAR; INTRAVENOUS EVERY 6 HOURS PRN
Status: DISCONTINUED | OUTPATIENT
Start: 2023-07-21 | End: 2023-07-21

## 2023-07-21 RX ORDER — DEXTROSE AND SODIUM CHLORIDE 5; .9 G/100ML; G/100ML
75 INJECTION, SOLUTION INTRAVENOUS CONTINUOUS
Status: DISCONTINUED | OUTPATIENT
Start: 2023-07-21 | End: 2023-07-21

## 2023-07-21 RX ORDER — SODIUM CHLORIDE 9 MG/ML
9 INJECTION, SOLUTION INTRAVENOUS CONTINUOUS
Status: DISCONTINUED | OUTPATIENT
Start: 2023-07-21 | End: 2023-07-21

## 2023-07-21 RX ORDER — DEXMEDETOMIDINE HYDROCHLORIDE 100 UG/ML
INJECTION, SOLUTION INTRAVENOUS AS NEEDED
Status: DISCONTINUED | OUTPATIENT
Start: 2023-07-21 | End: 2023-07-21 | Stop reason: SURG

## 2023-07-21 RX ORDER — SODIUM CHLORIDE 0.9 % (FLUSH) 0.9 %
10 SYRINGE (ML) INJECTION AS NEEDED
Status: DISCONTINUED | OUTPATIENT
Start: 2023-07-21 | End: 2023-07-21 | Stop reason: HOSPADM

## 2023-07-21 RX ORDER — ONDANSETRON 2 MG/ML
INJECTION INTRAMUSCULAR; INTRAVENOUS AS NEEDED
Status: DISCONTINUED | OUTPATIENT
Start: 2023-07-21 | End: 2023-07-21 | Stop reason: SURG

## 2023-07-21 RX ORDER — FAMOTIDINE 10 MG/ML
20 INJECTION, SOLUTION INTRAVENOUS ONCE
Status: DISCONTINUED | OUTPATIENT
Start: 2023-07-21 | End: 2023-07-21 | Stop reason: HOSPADM

## 2023-07-21 RX ORDER — FAMOTIDINE 20 MG/1
20 TABLET, FILM COATED ORAL ONCE
Status: DISCONTINUED | OUTPATIENT
Start: 2023-07-21 | End: 2023-07-21 | Stop reason: HOSPADM

## 2023-07-21 RX ORDER — HYDROMORPHONE HYDROCHLORIDE 1 MG/ML
0.5 INJECTION, SOLUTION INTRAMUSCULAR; INTRAVENOUS; SUBCUTANEOUS
Status: DISCONTINUED | OUTPATIENT
Start: 2023-07-21 | End: 2023-07-21 | Stop reason: HOSPADM

## 2023-07-21 RX ORDER — LIDOCAINE HYDROCHLORIDE 10 MG/ML
0.5 INJECTION, SOLUTION EPIDURAL; INFILTRATION; INTRACAUDAL; PERINEURAL ONCE AS NEEDED
Status: DISCONTINUED | OUTPATIENT
Start: 2023-07-21 | End: 2023-07-21 | Stop reason: HOSPADM

## 2023-07-21 RX ORDER — LIDOCAINE HYDROCHLORIDE 10 MG/ML
INJECTION, SOLUTION EPIDURAL; INFILTRATION; INTRACAUDAL; PERINEURAL AS NEEDED
Status: DISCONTINUED | OUTPATIENT
Start: 2023-07-21 | End: 2023-07-21 | Stop reason: SURG

## 2023-07-21 RX ORDER — HYDROCODONE BITARTRATE AND ACETAMINOPHEN 5; 325 MG/1; MG/1
1 TABLET ORAL ONCE AS NEEDED
Status: DISCONTINUED | OUTPATIENT
Start: 2023-07-21 | End: 2023-07-21 | Stop reason: HOSPADM

## 2023-07-21 RX ORDER — SODIUM CHLORIDE, SODIUM LACTATE, POTASSIUM CHLORIDE, CALCIUM CHLORIDE 600; 310; 30; 20 MG/100ML; MG/100ML; MG/100ML; MG/100ML
9 INJECTION, SOLUTION INTRAVENOUS CONTINUOUS
Status: DISCONTINUED | OUTPATIENT
Start: 2023-07-21 | End: 2023-07-21

## 2023-07-21 RX ORDER — DROPERIDOL 2.5 MG/ML
0.62 INJECTION, SOLUTION INTRAMUSCULAR; INTRAVENOUS ONCE AS NEEDED
Status: DISCONTINUED | OUTPATIENT
Start: 2023-07-21 | End: 2023-07-21 | Stop reason: HOSPADM

## 2023-07-21 RX ORDER — SODIUM CHLORIDE 9 MG/ML
40 INJECTION, SOLUTION INTRAVENOUS AS NEEDED
Status: DISCONTINUED | OUTPATIENT
Start: 2023-07-21 | End: 2023-07-21 | Stop reason: HOSPADM

## 2023-07-21 RX ORDER — FENTANYL CITRATE 50 UG/ML
50 INJECTION, SOLUTION INTRAMUSCULAR; INTRAVENOUS
Status: DISCONTINUED | OUTPATIENT
Start: 2023-07-21 | End: 2023-07-21 | Stop reason: HOSPADM

## 2023-07-21 RX ORDER — ROCURONIUM BROMIDE 10 MG/ML
INJECTION, SOLUTION INTRAVENOUS AS NEEDED
Status: DISCONTINUED | OUTPATIENT
Start: 2023-07-21 | End: 2023-07-21 | Stop reason: SURG

## 2023-07-21 RX ORDER — CHOLECALCIFEROL (VITAMIN D3) 125 MCG
10 CAPSULE ORAL NIGHTLY PRN
Status: DISCONTINUED | OUTPATIENT
Start: 2023-07-21 | End: 2023-07-24 | Stop reason: HOSPADM

## 2023-07-21 RX ADMIN — FENTANYL CITRATE 50 MCG: 50 INJECTION, SOLUTION INTRAMUSCULAR; INTRAVENOUS at 10:52

## 2023-07-21 RX ADMIN — FENTANYL CITRATE 50 MCG: 50 INJECTION, SOLUTION INTRAMUSCULAR; INTRAVENOUS at 10:47

## 2023-07-21 RX ADMIN — DEXMEDETOMIDINE HYDROCHLORIDE 8 MCG: 100 INJECTION, SOLUTION INTRAVENOUS at 10:20

## 2023-07-21 RX ADMIN — CEFAZOLIN SODIUM 2000 MG: 2 INJECTION, SOLUTION INTRAVENOUS at 09:56

## 2023-07-21 RX ADMIN — SODIUM CHLORIDE 9 ML/HR: 9 INJECTION, SOLUTION INTRAVENOUS at 08:40

## 2023-07-21 RX ADMIN — DEXTROSE AND SODIUM CHLORIDE 75 ML/HR: 5; 900 INJECTION, SOLUTION INTRAVENOUS at 04:27

## 2023-07-21 RX ADMIN — SODIUM CHLORIDE: 9 INJECTION, SOLUTION INTRAVENOUS at 10:01

## 2023-07-21 RX ADMIN — Medication 10 MG: at 23:01

## 2023-07-21 RX ADMIN — DEXTROSE AND SODIUM CHLORIDE 75 ML/HR: 5; 900 INJECTION, SOLUTION INTRAVENOUS at 16:00

## 2023-07-21 RX ADMIN — DEXMEDETOMIDINE HYDROCHLORIDE 4 MCG: 100 INJECTION, SOLUTION INTRAVENOUS at 10:30

## 2023-07-21 RX ADMIN — HYDROCODONE BITARTRATE AND ACETAMINOPHEN 1 TABLET: 5; 325 TABLET ORAL at 12:39

## 2023-07-21 RX ADMIN — FAMOTIDINE 20 MG: 20 TABLET, FILM COATED ORAL at 08:40

## 2023-07-21 RX ADMIN — PROPOFOL 150 MG: 10 INJECTION, EMULSION INTRAVENOUS at 09:51

## 2023-07-21 RX ADMIN — ROCURONIUM BROMIDE 40 MG: 10 SOLUTION INTRAVENOUS at 09:51

## 2023-07-21 RX ADMIN — LIDOCAINE 1 PATCH: 50 PATCH CUTANEOUS at 22:05

## 2023-07-21 RX ADMIN — ACETAMINOPHEN 650 MG: 325 TABLET ORAL at 22:06

## 2023-07-21 RX ADMIN — DEXAMETHASONE SODIUM PHOSPHATE 4 MG: 4 INJECTION, SOLUTION INTRAMUSCULAR; INTRAVENOUS at 09:51

## 2023-07-21 RX ADMIN — DEXMEDETOMIDINE HYDROCHLORIDE 4 MCG: 100 INJECTION, SOLUTION INTRAVENOUS at 10:04

## 2023-07-21 RX ADMIN — ONDANSETRON 4 MG: 2 INJECTION INTRAMUSCULAR; INTRAVENOUS at 09:51

## 2023-07-21 RX ADMIN — SUGAMMADEX 200 MG: 100 INJECTION, SOLUTION INTRAVENOUS at 10:36

## 2023-07-21 RX ADMIN — MEDROXYPROGESTERONE ACETATE 30 MG: 10 TABLET ORAL at 01:23

## 2023-07-21 RX ADMIN — LIDOCAINE HYDROCHLORIDE 50 MG: 10 INJECTION, SOLUTION EPIDURAL; INFILTRATION; INTRACAUDAL; PERINEURAL at 09:51

## 2023-07-21 NOTE — H&P
INTENSIVIST   PROGRESS NOTE          SUBJECTIVE     Ian 47 y.o. female is followed for:Vaginal Bleeding       Anemia    As an Intensivist, we provide an integrated approach to the ICU patient and family, medical management of comorbid conditions, including but not limited to electrolytes, glycemic control, organ dysfunction, lead interdisciplinary rounds and coordinate the care with all other services, including those from other specialists.     HPI:    Ian is a 47 y.o. female, who presented to this Hospital on 7/20/2023 because of fatigue, malaise, dyspnea, dizzy and pallor.     She is post-menopausal.    She has had vaginal bleeding for about a month. Currently about 1 pad/hour. She is passing large clot, reportedly the size of a baseball.    About a week ago, she saw a PCP ([facility]), for a routine check. She has not seen a physician in a long time, and she is not taking medicines on a regular basis. She got her lab work up. She was started on ativan and ropinirole for paresthesias in her feet.    The following day, 07/14/23, she was called and instructed to go to ED for evaluation. She was seen in the ED at [place]. Her Hb was Her vaginal US report indicated a normal size uterus and a 3.1 cm left ovarian cyst. A renal ultrasound showed bilateral renal cortical atrophy without hydronephrosis. She was discharged from the ED on Medroxyprogesterone 10 mg daily. Lab work-up while in ED included UA, PTT and PT but no chemistries or CBC.    After that she went to [place] with her family, but stayed in the room and she had no energy to go out. Tonight they came back and went straight to the ED for further evaluation.    She had already received 1 unit of PRBC and she has started to feel better.    Temp  Min: 98.3 °F (36.8 °C)  Max: 99.3 °F (37.4 °C)     PMH: She  has no past medical history on file.   PSxH: She  has no past surgical history on file.     Medications:  No current facility-administered  medications on file prior to encounter.     No current outpatient medications on file prior to encounter.        Allergies: She is allergic to augmentin [amoxicillin-pot clavulanate].   FH: Her family history is not on file.   SH: She  reports that she has been smoking cigarettes. She started smoking about 33 years ago. She has a 15.00 pack-year smoking history. She has been exposed to tobacco smoke. She has never used smokeless tobacco. She reports that she does not currently use alcohol. She reports that she does not use drugs.     The patient's relevant past medical, surgical and social history were reviewed and updated in Epic as appropriate.        History     Last Reviewed by Mitch Das MD on 2023 at 12:38 AM    Sections Reviewed    Medical, Family, Social Documentation    Problem list reviewed by Mitch Das MD on 2023 at 12:38 AM  Medicines reviewed by Mitch Das MD on 2023 at 12:37 AM  Allergies reviewed by Mitch Das MD on 2023 at 12:37 AM       Review of Systems  As described in the HPI.       OBJECTIVE     Vitals:  Temp: 99.3 °F (37.4 °C) (23 0028) Temp  Min: 98.3 °F (36.8 °C)  Max: 99.3 °F (37.4 °C)   Temp core:      BP: 128/57 (23 0100) BP  Min: 91/53  Max: 134/63   MAP (non-invasive) Noninvasive MAP (mmHg): 71 (23 0100) Noninvasive MAP (mmHg)  Av.7  Min: 66  Max: 91   Pulse: 112 (23 0100) Pulse  Min: 107  Max: 118   Resp: 20 (23 0028) Resp  Min: 16  Max: 20   SpO2: 95 % (23 0100) SpO2  Min: 94 %  Max: 100 %   Device: room air (23 0000)    Flow Rate:   No data recorded     Physical Examination  Telemetry:  Rhythm: sinus tachycardia (23)         Constitutional:  No acute distress.   Cardiovascular: RRR.    Respiratory: Normal breath sounds  No adventitious sounds   Abdominal:  Soft with no tenderness.   Extremities: Trace Edema   Neurological:   Alert, Oriented, Cooperative.  Best Eye Response: 4-->(E4)  spontaneous (07/20/23 2016)  Best Motor Response: 6-->(M6) obeys commands (07/20/23 2016)  Best Verbal Response: 5-->(V5) oriented (07/20/23 2016)  Freddy Coma Scale Score: 15 (07/20/23 2016)     Results Reviewed:  Laboratory  Microbiology  Radiology  Pathology    Hematology:  Results from last 7 days   Lab Units 07/20/23 1953   WBC 10*3/mm3 10.32   NEUTROS ABS 10*3/mm3 7.17*   IMM GRAN % % 0.9*   LYMPHS ABS 10*3/mm3 2.21   MONOS ABS 10*3/mm3 0.63   EOS ABS 10*3/mm3 0.21   BASOS ABS 10*3/mm3 0.01     Results from last 7 days   Lab Units 07/20/23 1953   HEMOGLOBIN g/dL 4.3*   MCV fL 112.2*   RDW % 16.6*   PLATELETS 10*3/mm3 296      Chemistry:  Estimated Creatinine Clearance: 15.8 mL/min (by C-G formula based on SCr of 5.3 mg/dL).    Results from last 7 days   Lab Units 07/20/23 1959 07/20/23 1953   SODIUM mmol/L  --  136   POTASSIUM mmol/L  --  4.0   CHLORIDE mmol/L  --  105   CO2 mmol/L  --  15.0*   BUN mg/dL  --  32*   CREATININE mg/dL 5.30 4.26*   GLUCOSE mg/dL  --  138*     Results from last 7 days   Lab Units 07/20/23 1953   CALCIUM mg/dL 8.3*       Hepatic Panel:  Results from last 7 days   Lab Units 07/20/23 1953   ALBUMIN g/dL 3.3*   BILIRUBIN mg/dL <0.2   AST (SGOT) U/L 10   ALT (SGPT) U/L 11   ALK PHOS U/L 38*        Coagulation Labs:  Results from last 7 days   Lab Units 07/20/23 1953   PROTIME Seconds 14.5   INR  1.11        Cardiac Labs:  Results from last 7 days   Lab Units 07/20/23 1953   HSTROP T ng/L 25*       Arterial Blood Gases:  Results from last 7 days   Lab Units 07/20/23  2307   PH, ARTERIAL pH units 7.360   PCO2, ARTERIAL mm Hg 29.4*   PO2 ART mm Hg 84.8   FIO2 % 21       Images:  CT Abdomen Pelvis Without Contrast    Result Date: 7/20/2023  Chest- 1. Pulmonary emphysema 2. Nonspecific subtle centrilobular groundglass nodules in the upper lungs. Respiratory bronchiolitis is a primary consideration 3. Pulmonary nodules up to 7 mm. Recommend follow-up chest CT in 6 months 4. Calcific  coronary atherosclerosis Abdomen/pelvis- 1. No acute process. No obstructive uropathy 2. Bilateral renal atrophy 3. 1.3 cm hyperattenuating right renal lesion, likely proteinaceous cyst, less likely mass. Further evaluation with contrast-enhanced MRI or CT should be considered 4. Colonic diverticulosis Electronically Signed: Tacos Collier  7/20/2023 8:59 PM EDT  Workstation ID: [id number]    CT Chest Without Contrast Diagnostic    Result Date: 7/20/2023  Chest- 1. Pulmonary emphysema 2. Nonspecific subtle centrilobular groundglass nodules in the upper lungs. Respiratory bronchiolitis is a primary consideration 3. Pulmonary nodules up to 7 mm. Recommend follow-up chest CT in 6 months 4. Calcific coronary atherosclerosis Abdomen/pelvis- 1. No acute process. No obstructive uropathy 2. Bilateral renal atrophy 3. 1.3 cm hyperattenuating right renal lesion, likely proteinaceous cyst, less likely mass. Further evaluation with contrast-enhanced MRI or CT should be considered 4. Colonic diverticulosis Electronically Signed: Tacos Collier  7/20/2023 8:59 PM EDT  Workstation ID: [id number]    US Non-ob Transvaginal    Result Date: 7/20/2023  Impression: 1. Technically limited exam, but with prominent, perhaps mildly enlarged size of the uterus, and heterogeneous, thickened and ill-defined endometrium. Suspected fibroids, not clearly visualized on this exam. Consider evaluation by gynecology service. If the patient is unable to tolerate further ultrasound scanning, pelvic MRI may be helpful at some point. 2. Grossly normal-appearing right ovary. 3. 3.7 cm left ovarian cyst, slightly irregular in outline, but largely appearing as a simple cyst. Electronically Signed: Victor Hugo Valdes  7/20/2023 10:57 PM EDT  Workstation ID: [id number]    XR Chest 1 View    Result Date: 7/20/2023  Impression: No acute cardiopulmonary findings. Electronically Signed: David Sarmiento  7/20/2023 7:47 PM EDT  Workstation ID: [id number]     Echo:      Results:  Reviewed.  I reviewed the patient's new laboratory and imaging results.  I independently reviewed the patient's new images.    Medications: Reviewed.    Assessment    A/P     Hospital:  LOS: 1 day   ICU: 1h     Active Hospital Problems    Diagnosis    **Anemia [D64.9]     Ian is a 47 y.o. female admitted on 7/20/2023 with Anemia [D64.9]    Assessment/Management/Treatment Plan:    Anemia severe, ABL  Vaginal bleeding x 1 month  Vaginal US 07/20/23: Prominent uterus and heterogenous, thickened and ill-defined endometrium. R/O Fibroids.  Macrocytosis    Hb 9.4  (07/13/23)    Lab Results   Component Value Date    HGB 4.3 (C) 07/20/2023     Renal  KEDAR/CKD- R/O pre-renal, R/O ATN  Bilateral renal atrophy. No obstructive uropathy as per CT A/P on admission    sCr 3.1 (07/13/23)    Lab Results   Component Value Date    CREATININE 5.30 07/20/2023    CREATININE 4.26 (H) 07/20/2023     Emphysema per CT Chest done on admission  Multiple pulmonary nodules ~ 7 mm in diameter  Tobacco use 1-2 ppd > 30 years  Endocrine  Body mass index is 37.61 kg/m². Obese Class II: 35-39.9kg/m2  No history of T2 Diabetes    Results from last 7 days   Lab Units 07/20/23  1959   GLUCOSE mg/dL 138*       Diet: NPO Diet NPO Type: Strict NPO  No active supplement orders      Advance Directives: Code Status and Medical Interventions:   Ordered at: 07/21/23 0028     Code Status (Patient has no pulse and is not breathing):    CPR (Attempt to Resuscitate)     Medical Interventions (Patient has pulse or is breathing):    Full Support     Release to patient:    Routine Release        DVT prophylaxis:  Mechanical DVT prophylaxis orders are present.       In brief:  Discussed with Dr. Adebayo Navarro (Emergency Medicine)  Medroxyprogesterone 30 mg PO x 1 as per Dr. Nguyễn's recommendation  Discussed with Dr. Nguyễn. Keep NPO.  Check B12, Folate, TSH, Retics.  ECHO in AM  Disposition: Admit to ICU    Plan of care and goals reviewed during  interdisciplinary rounds.  I discussed the patient's findings and my recommendations with patient and ED attending    MDM:    Problem(s) High due to: Acute or Chronic illness or injury that may poses a threat to life or bodily function  Data: High due to: Review of prior external records from each unique source, Review of the result(s) of each unique test, Ordering of each unique test, and Discuss management or test interpretation with external physician or NPP (not separately reported) (Dr. Nguyễn and Dr. Adebayo Navarro (Emergency Medicine)     High      [x] Primary Attending Intensive Care Medicine - Nutrition Support   [] Consultant    Copied text in this note has been reviewed and is accurate as of 07/21/23

## 2023-07-21 NOTE — ANESTHESIA PREPROCEDURE EVALUATION
Anesthesia Evaluation     Patient summary reviewed and Nursing notes reviewed   history of anesthetic complications:  prolonged sedation  NPO Solid Status: > 8 hours  NPO Liquid Status: > 2 hours           Airway   Mallampati: II  TM distance: >3 FB  Neck ROM: full  No difficulty expected  Dental - normal exam     Pulmonary - normal exam    breath sounds clear to auscultation  (+) a smoker (2ppd) Current, COPD mild,  Cardiovascular - negative cardio ROS and normal exam    ECG reviewed  Rhythm: regular  Rate: normal        Neuro/Psych- negative ROS  GI/Hepatic/Renal/Endo    (+) renal disease ARF    Musculoskeletal     Abdominal    Substance History      OB/GYN      Comment: Uterine hemorrhage hgb 6 currently up from 4      Other - negative ROS                       Anesthesia Plan    ASA 3 - emergent     general     intravenous induction     Anesthetic plan, risks, benefits, and alternatives have been provided, discussed and informed consent has been obtained with: patient.    Plan discussed with CRNA.    CODE STATUS:    Code Status (Patient has no pulse and is not breathing): CPR (Attempt to Resuscitate)  Medical Interventions (Patient has pulse or is breathing): Full Support  Release to patient: Routine Release

## 2023-07-21 NOTE — NURSING NOTE
Spoke with Claritza MARSHALL at [facility] for recent ED visit medical information. Will gather requested information and send via fax.

## 2023-07-21 NOTE — CASE MANAGEMENT/SOCIAL WORK
Discharge Planning Assessment  [facility]     Patient Name: Ian Romero  MRN: [id number]  Today's Date: 7/21/2023    Admit Date: 7/20/2023    Plan: Home   Discharge Needs Assessment       Row Name 07/21/23 1017       Living Environment    People in Home spouse    Current Living Arrangements home    Primary Care Provided by self    Provides Primary Care For no one    Family Caregiver if Needed child(emerson), adult;spouse    Able to Return to Prior Arrangements yes       Transition Planning    Patient/Family Anticipates Transition to home    Transportation Anticipated family or friend will provide       Discharge Needs Assessment    Readmission Within the Last 30 Days no previous admission in last 30 days    Concerns to be Addressed financial/insurance    Current Discharge Risk financial support inadequate                   Discharge Plan       Row Name 07/21/23 1018       Plan    Plan Home    Patient/Family in Agreement with Plan yes    Plan Comments I spoke with Mrs. Romero's daughter, Kerri, on the phone today and she provides all the following information. Mrs. Romero lives with her  in [facility]. She is independent with mobility and activities of daily living. She drives herself when leaving the home and is not current with any home or outpatient services. She has no medical equipment at home and does endorse difficulty affording her medications (this is due to no insurance coverage). Kerri anticipate her mother going home at the time of discharge and she will transport her at that time.     [employer] has screened Mrs. Romero for Medicaid and she does not qualify at this time. Will ask [employer] to rescreen once more medical information is available. CM will continue to follow.    Final Discharge Disposition Code 01 - home or self-care                  Continued Care and Services - Admitted Since 7/20/2023    Coordination has not been started for this encounter.       Expected Discharge Date and Time        Expected Discharge Date Expected Discharge Time    Jul 28, 2023            Demographic Summary       Row Name 07/21/23 1016       General Information    General Information Comments Confirmed PCP to be Addison Yepez and Mrs. Romero does not have any health insurance coverage.                   Functional Status       Row Name 07/21/23 1017       Functional Status, IADL    Medications independent    Meal Preparation independent    Housekeeping independent    Laundry independent    Shopping independent       Employment/    Employment Status employed full-time    Shift Worked first shift                   Psychosocial    No documentation.                  Abuse/Neglect    No documentation.                  Legal    No documentation.                  Substance Abuse    No documentation.                  Patient Forms    No documentation.                     Al Quevedo RN

## 2023-07-21 NOTE — ANESTHESIA POSTPROCEDURE EVALUATION
Patient: Ian Romero    Procedure Summary       Date: 07/21/23 [facility] / [facility]:  [facility]    Anesthesia Start: 0946 Anesthesia Stop: 1056    Procedure: DILATATION AND CURETTAGE WITH SUCTION (Vagina) Diagnosis: (Menorrhagia)    Surgeons: Jono Nguyễn MD Provider: Alex Victor MD    Anesthesia Type: general ASA Status: 3 - Emergent            Anesthesia Type: general    Vitals  Vitals Value Taken Time   /68 07/21/23 1054   Temp     Pulse 92 07/21/23 1055   Resp     SpO2 98 % 07/21/23 1055   Vitals shown include unvalidated device data.        Post Anesthesia Care and Evaluation    Patient location during evaluation: PACU  Patient participation: complete - patient participated  Level of consciousness: sleepy but conscious  Pain score: 0  Pain management: adequate    Airway patency: patent  Anesthetic complications: No anesthetic complications  PONV Status: none  Cardiovascular status: hemodynamically stable and acceptable  Respiratory status: nonlabored ventilation, acceptable and nasal cannula  Hydration status: acceptable

## 2023-07-21 NOTE — ANESTHESIA PROCEDURE NOTES
Airway  Urgency: elective    Date/Time: 7/21/2023 9:54 AM  Airway not difficult    General Information and Staff    Patient location during procedure: OR  CRNA/CAA: Kassy Pickens CRNA    Indications and Patient Condition  Indications for airway management: airway protection    Preoxygenated: yes  MILS not maintained throughout  Mask difficulty assessment: 1 - vent by mask    Final Airway Details  Final airway type: endotracheal airway      Successful airway: ETT  Cuffed: yes   Successful intubation technique: video laryngoscopy  Facilitating devices/methods: intubating stylet  Endotracheal tube insertion site: oral  Blade: Chaves  Blade size: 3  ETT size (mm): 7.0  Cormack-Lehane Classification: grade I - full view of glottis  Placement verified by: chest auscultation and capnometry   Measured from: lips  ETT/EBT  to lips (cm): 20  Number of attempts at approach: 1  Assessment: lips, teeth, and gum same as pre-op and atraumatic intubation    Additional Comments  Negative epigastric sounds, Breath sound equal bilaterally with symmetric chest rise and fall

## 2023-07-21 NOTE — PLAN OF CARE
Goal Outcome Evaluation:  Pt arrived to 2A around midnight for uterine bleeding with large clots.   H/H was 4.3/13.8, 3 units PRBC given, H/H recheck was 6.8/20.4, 2 more units ordered stat.   D&C scheduled for today (7/21), NPO   Little UOP, 200 mL from in&out cath  Tmax 99.5  D5 with 0.9% NS infusing @ 75 mL/hr  Room air  A/Ox4

## 2023-07-21 NOTE — PROGRESS NOTES
Postop D&C hopefully bleeding will stay stopped.  Cervical biopsies done as she has not had a Pap smear for several years.  Pathology in 2 days.

## 2023-07-21 NOTE — PLAN OF CARE
Goal Outcome Evaluation:   No bleeding since returning from OR. HR and BP improved. Room air, sats >93%. HGB improved. Nephrology consulted. Collins ordered for strict I/O per nephrology, pt refused.  ml and x1 occurrence. Pt has been encouraged to get up to chair multiple times, patient refused. Additionally  refusing bath, SCDs, and 1800 H&H draw despite education on importance of these interventions. Resting comfortably in bed. Diet ordered and is tolerating. Daughter at bedside.

## 2023-07-21 NOTE — CONSULTS
"Consult Note    Referring Provider: Dr. Mitch Das  Reason for Consultation: Patient presents to ER with heavy menstrual flow.  Found to have low hemoglobin of 6 for 3.  Also found to have some renal failure.  Admitted to ICU I was consulted to help stop the bleeding.      Chief complaint patient presents with heavy bleeding.    Subjective .     History of present illness: Ian Romero is a 47 y.o. female who presents for consultation for for heavy bleeding.  Found to have a extremely low hemoglobin and renal failure in the ER.  Patient has not been seen by  In 21 years.  Patient reports vaginal births in the past no history of abnormal Pap smear.  Patient was seen in ER elsewhere and placed on birth control pills but this has not worked.  Patient started on blood in the ER and admitted to the ICU..  Patient thinks she has a abdominal hernia as well      History  History reviewed. No pertinent past medical history., No past surgical history on file.,   No medications prior to admission.   , Allergies:  Augmentin [amoxicillin-pot clavulanate], OBGYN History 2 vaginal births., History reviewed. No pertinent family history., and   Social History     Tobacco Use    Smoking status: Every Day     Packs/day: 1.00     Years: 15.00     Pack years: 15.00     Types: Cigarettes     Start date: 1990     Passive exposure: Current    Smokeless tobacco: Never   Vaping Use    Vaping Use: Never used   Substance Use Topics    Alcohol use: Not Currently    Drug use: Never       Review of Systems  Review of Systems       Objective     Vital Signs   Blood pressure 128/57, pulse 112, temperature 99.3 °F (37.4 °C), temperature source Oral, resp. rate 20, height 167.6 cm (66\"), weight 106 kg (233 lb), SpO2 95 %.    Physical Exam:  Physical Exam pelvic exam reveals blood clots in the vaginal vault and a large blood clot protruding from the cervix.  I do not feel a mass on the cervix.  Her uterus is very hard to tell size but if feels " may be 6 or 8 weeks size if that.  I was not able to see her cervix and did not do a speculum exam in the bed in the ICU.             Radiology:             Imaging Results (Last 24 Hours)       Procedure Component Value Units Date/Time    US Non-ob Transvaginal [[id number]] Collected: 07/20/23 2246     Updated: 07/20/23 2300    Narrative:      US NON-OB TRANSVAGINAL    Date of Exam: 7/20/2023 10:06 PM EDT    Indication: heavy vaginal bleeding; post-menopausal.    Comparison: No comparisons available.    Technique: Transvaginal pelvic ultrasound was performed.  Grayscale and color Doppler imaging was utilized to evaluate the pelvic area with image documentation per protocol.      Findings:  Sonographer indicates that the patient has limited ability to tolerate the exam, which limits detail somewhat. The uterus is borderline enlarged in size, particular for patient's age, at 8.1 x 4.1 x 4.8 cm. Image detail on the scan is limited.   Endometrium is heterogeneous and ill-defined, with mixed echogenicity, roughly estimated at 1.2 cm in width, although appearance is inconsistent from imaged image.    The sonographer notes a rounded hypoechoic nodular area in the cervix 1.3 cm in diameter, somewhat difficult to appreciate on this exam. There appear to be a couple of nearly isoechoic uterine fibroids, maximum 1.4 cm in diameter and 3 cm in diameter but   poorly seen.    Right ovary measures 3.5 x 2.0 x 2.9 cm and contains multiple small cysts. There is expected color Doppler flow.    Left ovary is larger at 4.4 x 2.6 x 3.2 cm and contains a thin-walled, mildly irregularly-shaped 3.7 cm cyst which appears anechoic except for a single thin internal septation. There is normal color Doppler flow in the left ovary. No pathologic adnexal   mass or abnormal intrapelvic free fluid is seen.        Impression:      Impression:    1. Technically limited exam, but with prominent, perhaps mildly enlarged size of the uterus, and  heterogeneous, thickened and ill-defined endometrium. Suspected fibroids, not clearly visualized on this exam. Consider evaluation by gynecology service. If   the patient is unable to tolerate further ultrasound scanning, pelvic MRI may be helpful at some point.    2. Grossly normal-appearing right ovary.    3. 3.7 cm left ovarian cyst, slightly irregular in outline, but largely appearing as a simple cyst.    Electronically Signed: Victor Hugo Valdes    7/20/2023 10:57 PM EDT    Workstation ID: [id number]    CT Abdomen Pelvis Without Contrast [[id number]] Collected: 07/20/23 2045     Updated: 07/20/23 2102    Narrative:      CT CHEST WO CONTRAST DIAGNOSTIC, CT ABDOMEN PELVIS WO CONTRAST    Date of Exam: 7/20/2023 8:29 PM EDT    Indication: anemia, SOA, heavy smoker, acute renal failure.    Comparison: None available.    Technique: Axial CT images were obtained of the chest abdomen and pelvis without contrast administration.  Reconstructed coronal and sagittal images were also obtained. Automated exposure control and iterative construction methods were used.      Findings:        Chest:    Venus/mediastinum: No adenopathy. Thoracic aorta normal in caliber. Coronary artery calcification. No pericardial effusion    Lungs/pleura: Emphysema in the apices. Subtle centrilobular groundglass nodules in the upper lungs. Plaque-like nodules related to the major and minor fissures compatible with perifissural lymph nodes. 4 mm nodule in the anterior right upper lobe. 7 mm   solid nodule in the left lower lobe. No airspace consolidation. No pleural effusion    Bones/soft tissues: No acute bony abnormality      Abdomen/Pelvis:    Organs: No urolithiasis or hydronephrosis. Bilateral renal atrophy 1.3 cm exophytic right renal lesion with density greater than simple fluid. Liver, spleen, pancreas, adrenal glands have an unremarkable noncontrast appearance. Gallbladder surgically   absent    Gastrointestinal: No acute abnormality. Normal  appendix. Diverticula of the descending and sigmoid colon with no associated inflammation. No mesenteric adenopathy    Pelvis: No abnormal fluid collection. Reproductive organs within normal limits, with 2.5 cm left ovarian cyst almost certainly benign. Urinary bladder unremarkable    Peritoneum/Retroperitoneum: No ascites or pneumoperitoneum. No retroperitoneal adenopathy. Normal caliber aorta    Bones/Soft Tissues: No acute bony abnormality. Large fat-containing umbilical hernia      Impression:        Chest-  1. Pulmonary emphysema  2. Nonspecific subtle centrilobular groundglass nodules in the upper lungs. Respiratory bronchiolitis is a primary consideration  3. Pulmonary nodules up to 7 mm. Recommend follow-up chest CT in 6 months  4. Calcific coronary atherosclerosis    Abdomen/pelvis-  1. No acute process. No obstructive uropathy  2. Bilateral renal atrophy  3. 1.3 cm hyperattenuating right renal lesion, likely proteinaceous cyst, less likely mass. Further evaluation with contrast-enhanced MRI or CT should be considered  4. Colonic diverticulosis            Electronically Signed: Tacos Collier    7/20/2023 8:59 PM EDT    Workstation ID: [id number]    CT Chest Without Contrast Diagnostic [[id number]] Collected: 07/20/23 2045     Updated: 07/20/23 2102    Narrative:      CT CHEST WO CONTRAST DIAGNOSTIC, CT ABDOMEN PELVIS WO CONTRAST    Date of Exam: 7/20/2023 8:29 PM EDT    Indication: anemia, SOA, heavy smoker, acute renal failure.    Comparison: None available.    Technique: Axial CT images were obtained of the chest abdomen and pelvis without contrast administration.  Reconstructed coronal and sagittal images were also obtained. Automated exposure control and iterative construction methods were used.      Findings:        Chest:    Venus/mediastinum: No adenopathy. Thoracic aorta normal in caliber. Coronary artery calcification. No pericardial effusion    Lungs/pleura: Emphysema in the apices. Subtle  centrilobular groundglass nodules in the upper lungs. Plaque-like nodules related to the major and minor fissures compatible with perifissural lymph nodes. 4 mm nodule in the anterior right upper lobe. 7 mm   solid nodule in the left lower lobe. No airspace consolidation. No pleural effusion    Bones/soft tissues: No acute bony abnormality      Abdomen/Pelvis:    Organs: No urolithiasis or hydronephrosis. Bilateral renal atrophy 1.3 cm exophytic right renal lesion with density greater than simple fluid. Liver, spleen, pancreas, adrenal glands have an unremarkable noncontrast appearance. Gallbladder surgically   absent    Gastrointestinal: No acute abnormality. Normal appendix. Diverticula of the descending and sigmoid colon with no associated inflammation. No mesenteric adenopathy    Pelvis: No abnormal fluid collection. Reproductive organs within normal limits, with 2.5 cm left ovarian cyst almost certainly benign. Urinary bladder unremarkable    Peritoneum/Retroperitoneum: No ascites or pneumoperitoneum. No retroperitoneal adenopathy. Normal caliber aorta    Bones/Soft Tissues: No acute bony abnormality. Large fat-containing umbilical hernia      Impression:        Chest-  1. Pulmonary emphysema  2. Nonspecific subtle centrilobular groundglass nodules in the upper lungs. Respiratory bronchiolitis is a primary consideration  3. Pulmonary nodules up to 7 mm. Recommend follow-up chest CT in 6 months  4. Calcific coronary atherosclerosis    Abdomen/pelvis-  1. No acute process. No obstructive uropathy  2. Bilateral renal atrophy  3. 1.3 cm hyperattenuating right renal lesion, likely proteinaceous cyst, less likely mass. Further evaluation with contrast-enhanced MRI or CT should be considered  4. Colonic diverticulosis            Electronically Signed: Tacos Collier    7/20/2023 8:59 PM EDT    Workstation ID: [id number]    XR Chest 1 View [[id number]] Collected: 07/20/23 1946     Updated: 07/20/23 1950    Narrative:       XR CHEST 1 VW    Date of Exam: 7/20/2023 7:34 PM EDT    Indication: SOA    Comparison: None available.    Findings:  Normal cardiomediastinal silhouette. The lungs are clear. No pleural effusion or pneumothorax. No acute osseous findings.      Impression:      Impression:  No acute cardiopulmonary findings.      Electronically Signed: David Sarmiento    7/20/2023 7:47 PM EDT    Workstation ID: [id number]            Results Review:   I reviewed the patient's new clinical results.  Discussed with Dr. Das , CT scan shows atrophic kidneys no pelvic mass normal uterus on scan.  Ultrasound is confusing it appears the uterus is slightly enlarged does not appear to be a large mass.  There is no evidence of renal obstruction at least from what I can see.        Assessment    Patient  Problems Addressed this Visit    None  Visit Diagnoses       Dysfunctional uterine bleeding    -  Primary    Symptomatic anemia        Acute renal failure, unspecified acute renal failure type        Metabolic acidosis        Uterine leiomyoma, unspecified location        Tobacco dependence        Relevant Medications    nicotine (NICODERM CQ) 21 MG/24HR patch 1 patch          Diagnoses         Codes Comments    Dysfunctional uterine bleeding    -  Primary ICD-10-CM: N93.8  ICD-9-CM: 626.8     Symptomatic anemia     ICD-10-CM: D64.9  ICD-9-CM: 285.9     Acute renal failure, unspecified acute renal failure type     ICD-10-CM: N17.9  ICD-9-CM: 584.9     Metabolic acidosis     ICD-10-CM: E87.20  ICD-9-CM: 276.2     Uterine leiomyoma, unspecified location     ICD-10-CM: D25.9  ICD-9-CM: 218.9     Tobacco dependence     ICD-10-CM: F17.200  ICD-9-CM: 305.1            Patient obviously having some sort of dysfunctional uterine bleeding leading to this severe anemia.  I do not think it is a mass or cervical cancer.  The dysfunctional bleeding could be set off by the abnormalities secondary to her renal failure status.  The best way to stop the  bleeding is to do a D&C that will give us a diagnosis and perhaps treat the problem.  We will give her progesterone in the meantime.  After her blood is sent and she is stable were going to do a D&C in the a.m.      Plan    We will do D&C in the main OR tomorrow sometime as the schedule permits.  Provera 30 mg given at this time.  Keep patient n.p.o. for now.  I have her sign a permit for a D&C.  Risks were explained to her at length.      Jono Nguyễn MD

## 2023-07-21 NOTE — CONSULTS
Referring Provider: Dr. Soy Velasquez  Reason for Consultation: Acute kidney failure    Subjective     Chief complaint vaginal bleed severe anemia    History of present illness:    47-year-old female with 1 month history of vaginal bleed presented to the ED with complaints of fatigue, malaise, dizziness, pallor, dyspnea.  She has no previous history of kidney disease.  Family history include renal cell CA father, mother has some CKD.  Patient had denied use of NSAID, recurrent UTI, kidney stone, epistaxis, hemoptysis, hematemesis, gross hematuria, recent antibiotic use.  Patient is from [place], her renal ultrasound showed bilateral renal cortical atrophy without hydronephrosis.  Patient was seen on 7/14/2023 in the ED department [place].  She went to [place] for a few days came back on the day of admission.  Hemoglobin was 4.3 creatinine of 4.26.  Albumin 3.3.  INR was normal.  Renal been consulted because of deterioration renal function.    History  History reviewed. No pertinent past medical history., History reviewed. No pertinent surgical history., History reviewed. No pertinent family history.,   Social History     Socioeconomic History    Marital status:    Tobacco Use    Smoking status: Every Day     Packs/day: 1.00     Years: 15.00     Pack years: 15.00     Types: Cigarettes     Start date: 1990     Passive exposure: Current    Smokeless tobacco: Never   Vaping Use    Vaping Use: Never used   Substance and Sexual Activity    Alcohol use: Not Currently    Drug use: Never    Sexual activity: Defer     E-cigarette/Vaping    E-cigarette/Vaping Use Never User      E-cigarette/Vaping Substances    Nicotine No     THC No     CBD No     Flavoring No      E-cigarette/Vaping Devices    Disposable No     Pre-filled or Refillable Cartridge No     Refillable Tank No     Pre-filled Pod No          ,   No medications prior to admission.   , Scheduled Meds:  [MAR Hold] nicotine, 1 patch, Transdermal,  Q24H   , Continuous Infusions:  dextrose 5 % and sodium chloride 0.9 %, 75 mL/hr, Last Rate: 75 mL/hr (07/21/23 0427)  lactated ringers, 9 mL/hr  sodium chloride, 9 mL/hr, Last Rate: Stopped (07/21/23 1056)   , PRN Meds:    acetaminophen    [MAR Hold] ALPRAZolam    [MAR Hold] ondansetron    Potassium Replacement - Follow Nurse / BPA Driven Protocol, and Allergies:  Augmentin [amoxicillin-pot clavulanate]    Review of Systems  Pertinent items are noted in HPI, all other systems reviewed and negative    Objective     Vital Signs  Temp:  [97 °F (36.1 °C)-99.5 °F (37.5 °C)] 97.7 °F (36.5 °C)  Heart Rate:  [] 86  Resp:  [16-20] 18  BP: ()/(43-83) 112/62    I/O this shift:  In: 1932 [I.V.:600; Blood:1332]  Out: -   I/O last 3 completed shifts:  In: 2348.3 [I.V.:268.3; Blood:1080; IV Piggyback:1000]  Out: 680 [Urine:200; Other:480]    Physical Exam:  General appearance:  female morbidly obese comfortable without any obvious distress.  HEENT: Atraumatic normocephalic head, eyes pupil reactive, extraocular muscle intact, nose no bleed, oropharynx clear, neck is supple, no JVD, no lymph node enlargement, trachea midline.  Lungs: Clear to auscultation, equal chest movement, no crepitation.  Heart: Normal S1, S2, no gallop, murmur, RRR.  Abdomen: Morbid obesity soft, nontender, positive bowel sounds, no organomegaly.  Extremities: No edema, no cyanosis, no joint swelling.  Neuro: Alert, oriented x4, no focal deficit.  Psych: Mood and affect are normal and appropriate.  Skin: Skin is warm and dry.  : No suprapubic fullness or tenderness, no Collins catheter.    Results Review:   I reviewed the patient's new clinical results.  I reviewed the patient's new imaging results and agree with the interpretation.    WBC WBC   Date Value Ref Range Status   07/21/2023 8.14 3.40 - 10.80 10*3/mm3 Final   07/20/2023 10.32 3.40 - 10.80 10*3/mm3 Final      HGB Hemoglobin   Date Value Ref Range Status   07/21/2023 6.8 (C)  12.0 - 15.9 g/dL Final   07/20/2023 4.3 (C) 12.0 - 15.9 g/dL Final      HCT Hematocrit   Date Value Ref Range Status   07/21/2023 20.4 (C) 34.0 - 46.6 % Final   07/20/2023 -15 (A) 38 - 51 % Final   07/20/2023 13.8 (C) 34.0 - 46.6 % Final      Platlets No results found for: LABPLAT   MCV MCV   Date Value Ref Range Status   07/21/2023 91.5 79.0 - 97.0 fL Final   07/20/2023 112.2 (H) 79.0 - 97.0 fL Final          Sodium Sodium   Date Value Ref Range Status   07/21/2023 137 136 - 145 mmol/L Final   07/20/2023 136 136 - 145 mmol/L Final      Potassium Potassium   Date Value Ref Range Status   07/21/2023 4.1 3.5 - 5.2 mmol/L Final   07/20/2023 4.0 3.5 - 5.2 mmol/L Final      Chloride Chloride   Date Value Ref Range Status   07/21/2023 106 98 - 107 mmol/L Final   07/20/2023 105 98 - 107 mmol/L Final      CO2 CO2   Date Value Ref Range Status   07/21/2023 17.0 (L) 22.0 - 29.0 mmol/L Final   07/20/2023 15.0 (L) 22.0 - 29.0 mmol/L Final      BUN BUN   Date Value Ref Range Status   07/21/2023 33 (H) 6 - 20 mg/dL Final   07/20/2023 32 (H) 6 - 20 mg/dL Final      Creatinine Creatinine   Date Value Ref Range Status   07/21/2023 4.35 (H) 0.57 - 1.00 mg/dL Final   07/20/2023 5.30 mg/dL Final   07/20/2023 4.26 (H) 0.57 - 1.00 mg/dL Final      Calcium Calcium   Date Value Ref Range Status   07/21/2023 7.2 (L) 8.6 - 10.5 mg/dL Final   07/20/2023 8.3 (L) 8.6 - 10.5 mg/dL Final      PO4 No results found for: CAPO4   Albumin Albumin   Date Value Ref Range Status   07/21/2023 2.6 (L) 3.5 - 5.2 g/dL Final   07/20/2023 3.3 (L) 3.5 - 5.2 g/dL Final      Magnesium Magnesium   Date Value Ref Range Status   07/21/2023 1.9 1.6 - 2.6 mg/dL Final      Uric Acid No results found for: URICACID         HYDROcodone-acetaminophen, 1 tablet, Oral, Once  [MAR Hold] nicotine, 1 patch, Transdermal, Q24H      dextrose 5 % and sodium chloride 0.9 %, 75 mL/hr, Last Rate: 75 mL/hr (07/21/23 0427)  lactated ringers, 9 mL/hr  sodium chloride, 9 mL/hr, Last Rate:  Stopped (07/21/23 1056)        Assessment & Plan       Anemia    1.  Acute kidney injury: Baseline creatinine unknown.  KEDAR in the setting of severe anemia and hypotension.  Patient admission creatinine 4.26.  Ultrasound showed bilateral cortical atrophic kidney.  Hypotension noted with blood pressure in the 80s.  Urine labs shows urine creatinine on 115, urine sodium 42 urine eos 0.  2.  Severe anemia: Vaginal bleed ongoing for a month.  3.  Hypotension: Likely volume depletion secondary to blood loss.  4.  Metabolic acidosis secondary to renal failure.  5.  Hypoalbuminemia    Plan recommendation:  Bladder scan showed only 200 cc of urine.  Strict input output will be done.  Continue with the blood transfusion as needed.  Check urine protein creatinine ratio.  Check SARA, complement C3-C4, anti-double-stranded DNA, SC-3, MPO.  Avoid nephrotoxic medications.  Keep systolic blood pressure greater than 100.  Check volume status.  Check labs in the morning.  Daily evaluation for renal replacement therapy will be done.  No dialysis for today.  Adjust medication for the new GFR.  Case discussed with the medical staff taking care of the patient.  High risk complex patient with multiple medical problems.  Maria G Cuenca MD  07/21/23  @[facility]

## 2023-07-21 NOTE — OP NOTE
DILATATION AND CURETTAGE WITH SUCTION  Procedure Note    Delta Romero  7/21/2023    Pre-op Diagnosis:   Menorrhagia    Post-op Diagnosis:     Menorrhagia, small uterus    Procedure(s):  DILATATION AND CURETTAGE diagnostic and therapeutic  Cervical biopsy      Staff:  Surgeon(s):  Jono Nguyễn MD           Anesthesia: General    Staff:   Circulator: Lefty Anderson RN; Ashley Thacker RN  Scrub Person: Sheree Silverman    Estimated Blood Loss: none    Specimens:                Order Name Source Comment Collection Info Order Time   PREPARE RBC Other   7/21/2023  9:06 AM     When to Transfuse?   Hold          Indication for Transfusion   Surgery          Surgery Date   7/21/2023          Release to patient   Routine Release        TISSUE PATHOLOGY EXAM Endometrial Curettings  Collected By: Jono Nguyễn MD 7/21/2023 10:12 AM     Release to patient   Routine Release              Drains: * No LDAs found *    Indications: Patient is 47-year-old white female presented to ER with severe anemia vaginal bleeding and renal failure.  Patient continued to bleed despite infusion of blood and was brought to the OR for D&C to attempt to stop the bleeding.    Findings: Uterus small 8 cm deep.  Slow bleeding noted preop.  No polyps no abnormal tissue noted.    Operative procedure: After induction of general anesthesia and placement in a low lithotomy position the bladder was drained with a catheter weighted vest back was started cervix aggressing to tenaculum.  Uterus sounded to 8 cm.  Uterus felt normal size on pelvic exam.  Cervix was dilated.  A #8 suction cannula was placed and suction D&C was done.  There was a moderate amount of tissue gotten out.  There were no polyps after passing a Lawson stone forceps.  Several passes were made with the suction curette along with the suction.  A pass was then made with a sharp curette.  Walls of the uterus were symmetrical and easily curetted.  I decided to do 2 cervical biopsies 1  at 10 and at 4 as she has not had a Pap smear for 20 years.  Once done we put 3-0 Vicryl stitches and bleeding stopped from these areas.  We then massaged the uterus and watch for bleeding there was minimal bleeding after the D&C and the biopsies were done.  Uterus sounded to 8 cm.  Patient was then awakened taken recovery room in good condition blood loss was minimal she tolerated procedure very well there were no complications.    Complications: None          Jono Nguyễn MD     Date: 7/21/2023  Time: 10:44 EDT

## 2023-07-22 PROBLEM — N93.9 VAGINAL BLEEDING: Status: ACTIVE | Noted: 2023-07-22

## 2023-07-22 PROBLEM — D62 ACUTE BLOOD LOSS ANEMIA: Status: ACTIVE | Noted: 2023-07-21

## 2023-07-22 PROBLEM — F41.1 GENERALIZED ANXIETY DISORDER: Status: ACTIVE | Noted: 2023-07-22

## 2023-07-22 PROBLEM — N17.9 ACUTE RENAL FAILURE: Status: ACTIVE | Noted: 2023-07-22

## 2023-07-22 LAB
ALBUMIN SERPL-MCNC: 2.8 G/DL (ref 3.5–5.2)
ALBUMIN/GLOB SERPL: 1.3 G/DL
ALP SERPL-CCNC: 45 U/L (ref 39–117)
ALT SERPL W P-5'-P-CCNC: 7 U/L (ref 1–33)
ANION GAP SERPL CALCULATED.3IONS-SCNC: 13 MMOL/L (ref 5–15)
AST SERPL-CCNC: 8 U/L (ref 1–32)
BASOPHILS # BLD AUTO: 0.02 10*3/MM3 (ref 0–0.2)
BASOPHILS NFR BLD AUTO: 0.2 % (ref 0–1.5)
BH BB BLOOD EXPIRATION DATE: NORMAL
BH BB BLOOD TYPE BARCODE: 5100
BH BB DISPENSE STATUS: NORMAL
BH BB PRODUCT CODE: NORMAL
BH BB UNIT NUMBER: NORMAL
BILIRUB SERPL-MCNC: <0.2 MG/DL (ref 0–1.2)
BUN SERPL-MCNC: 33 MG/DL (ref 6–20)
BUN/CREAT SERPL: 7.8 (ref 7–25)
C3 SERPL-MCNC: 95 MG/DL (ref 82–167)
C4 SERPL-MCNC: 19 MG/DL (ref 14–44)
CALCIUM SPEC-SCNC: 7.3 MG/DL (ref 8.6–10.5)
CHLORIDE SERPL-SCNC: 108 MMOL/L (ref 98–107)
CO2 SERPL-SCNC: 18 MMOL/L (ref 22–29)
CREAT SERPL-MCNC: 4.22 MG/DL (ref 0.57–1)
CROSSMATCH INTERPRETATION: NORMAL
DEPRECATED RDW RBC AUTO: 57.1 FL (ref 37–54)
EGFRCR SERPLBLD CKD-EPI 2021: 12.3 ML/MIN/1.73
EOSINOPHIL # BLD AUTO: 0.06 10*3/MM3 (ref 0–0.4)
EOSINOPHIL NFR BLD AUTO: 0.7 % (ref 0.3–6.2)
ERYTHROCYTE [DISTWIDTH] IN BLOOD BY AUTOMATED COUNT: 19.5 % (ref 12.3–15.4)
GLOBULIN UR ELPH-MCNC: 2.1 GM/DL
GLUCOSE SERPL-MCNC: 143 MG/DL (ref 65–99)
HCT VFR BLD AUTO: 26.4 % (ref 34–46.6)
HCT VFR BLD AUTO: 26.6 % (ref 34–46.6)
HCT VFR BLD AUTO: 27.4 % (ref 34–46.6)
HGB BLD-MCNC: 8.9 G/DL (ref 12–15.9)
HGB BLD-MCNC: 8.9 G/DL (ref 12–15.9)
HGB BLD-MCNC: 9.3 G/DL (ref 12–15.9)
IMM GRANULOCYTES # BLD AUTO: 0.14 10*3/MM3 (ref 0–0.05)
IMM GRANULOCYTES NFR BLD AUTO: 1.6 % (ref 0–0.5)
LYMPHOCYTES # BLD AUTO: 1.51 10*3/MM3 (ref 0.7–3.1)
LYMPHOCYTES NFR BLD AUTO: 17.5 % (ref 19.6–45.3)
MAGNESIUM SERPL-MCNC: 1.7 MG/DL (ref 1.6–2.6)
MCH RBC QN AUTO: 29.7 PG (ref 26.6–33)
MCHC RBC AUTO-ENTMCNC: 33.5 G/DL (ref 31.5–35.7)
MCV RBC AUTO: 88.7 FL (ref 79–97)
MONOCYTES # BLD AUTO: 0.35 10*3/MM3 (ref 0.1–0.9)
MONOCYTES NFR BLD AUTO: 4.1 % (ref 5–12)
NEUTROPHILS NFR BLD AUTO: 6.54 10*3/MM3 (ref 1.7–7)
NEUTROPHILS NFR BLD AUTO: 75.9 % (ref 42.7–76)
NRBC BLD AUTO-RTO: 0 /100 WBC (ref 0–0.2)
PHOSPHATE SERPL-MCNC: 4.3 MG/DL (ref 2.5–4.5)
PLATELET # BLD AUTO: 190 10*3/MM3 (ref 140–450)
PMV BLD AUTO: 8.3 FL (ref 6–12)
POTASSIUM SERPL-SCNC: 4.4 MMOL/L (ref 3.5–5.2)
PROT SERPL-MCNC: 4.9 G/DL (ref 6–8.5)
RBC # BLD AUTO: 3 10*6/MM3 (ref 3.77–5.28)
SODIUM SERPL-SCNC: 139 MMOL/L (ref 136–145)
UNIT  ABO: NORMAL
UNIT  RH: NORMAL
WBC NRBC COR # BLD: 8.62 10*3/MM3 (ref 3.4–10.8)

## 2023-07-22 PROCEDURE — 86160 COMPLEMENT ANTIGEN: CPT | Performed by: INTERNAL MEDICINE

## 2023-07-22 PROCEDURE — 99232 SBSQ HOSP IP/OBS MODERATE 35: CPT | Performed by: INTERNAL MEDICINE

## 2023-07-22 PROCEDURE — 83516 IMMUNOASSAY NONANTIBODY: CPT | Performed by: INTERNAL MEDICINE

## 2023-07-22 PROCEDURE — 85014 HEMATOCRIT: CPT

## 2023-07-22 PROCEDURE — 85025 COMPLETE CBC W/AUTO DIFF WBC: CPT

## 2023-07-22 PROCEDURE — 99024 POSTOP FOLLOW-UP VISIT: CPT | Performed by: OBSTETRICS & GYNECOLOGY

## 2023-07-22 PROCEDURE — 80053 COMPREHEN METABOLIC PANEL: CPT

## 2023-07-22 PROCEDURE — 84100 ASSAY OF PHOSPHORUS: CPT

## 2023-07-22 PROCEDURE — 86225 DNA ANTIBODY NATIVE: CPT | Performed by: INTERNAL MEDICINE

## 2023-07-22 PROCEDURE — 86038 ANTINUCLEAR ANTIBODIES: CPT | Performed by: INTERNAL MEDICINE

## 2023-07-22 PROCEDURE — 85018 HEMOGLOBIN: CPT

## 2023-07-22 PROCEDURE — 83735 ASSAY OF MAGNESIUM: CPT

## 2023-07-22 RX ORDER — ALPRAZOLAM 0.5 MG/1
0.5 TABLET ORAL 2 TIMES DAILY PRN
Status: DISCONTINUED | OUTPATIENT
Start: 2023-07-22 | End: 2023-07-24 | Stop reason: HOSPADM

## 2023-07-22 RX ADMIN — ALPRAZOLAM 0.5 MG: 0.5 TABLET ORAL at 11:54

## 2023-07-22 RX ADMIN — SODIUM BICARBONATE 150 MEQ: 84 INJECTION INTRAVENOUS at 14:10

## 2023-07-22 RX ADMIN — SERTRALINE 50 MG: 50 TABLET, FILM COATED ORAL at 11:54

## 2023-07-22 RX ADMIN — ACETAMINOPHEN 650 MG: 325 TABLET ORAL at 20:41

## 2023-07-22 RX ADMIN — LIDOCAINE 1 PATCH: 50 PATCH CUTANEOUS at 21:37

## 2023-07-22 RX ADMIN — ALPRAZOLAM 0.5 MG: 0.5 TABLET ORAL at 23:25

## 2023-07-22 NOTE — SIGNIFICANT NOTE
"Contacted by the bedside RN as the patient has continued to demonstrate demanding behavior and wishing to leave AMA.     The patient was admitted on 7/20 for symptomatic anemia 2* vaginal bleeding evaluated by OB/GYN and underwent D&C. H&H stabilized after PRBC transfusion with satisfactory BP.     Nephrology also following for KEDAR with Cr now 4.35 (no baseline for comparison).     This evening the patient has demonstrated very rude behavior towards the bedside nursing staff. She continues to be compulsive and attempt to stand out of bed with discontinuation of her EKG/SpO2 monitors and even has tried to take out her IV. The bedside nurse and charge nurse have been in the room numerous times this shift attempting to educate the patient regarding the monitor use/purpose and overall safety implications.     Upon entering the room the patient is raising her voice and telling me that she wants to be \"knocked out\" or she's leaving. She is demanding numerous medications including Ativan, Xanax, Requip, Zoloft, Lexapro, and Prozac. UDS + benzodiazepines on admission that she admitted to taking from a friend. I explained that given her renal status these requests were not in her best interest and may worsen her renal function. Also that the Zoloft, Lexapro, and Prozac are medications that do not exhibit an immediate effect and take multiple weeks to work. She is continuing to shout that if we \"don't knock me out I'm going to leave\". I informed her that she may have either a melatonin or low dose Seroquel, however if she does not wish for these medications that she is allowed to leave on her terms, but that this is not within her best medical interest. She is willing to attempt the melatonin at this time.     During this interaction the patient continued to shout obscenities towards myself, the nurses, and her daughter (who was being very appropriate). I told her that this behavior is not acceptable to which she stated \"I " "don't need a speech\". I anticipate her leaving AMA in the near future.     Electronically signed by JESSICA Blanton, 07/21/23, 11:05 PM EDT.    "

## 2023-07-22 NOTE — PROGRESS NOTES
" LOS: 2 days   Patient Care Team:  Addison Yepez MD as PCP - General (Internal Medicine)    Chief Complaint: Acute renal failure    Subjective:  Symptoms:  Stable.  No shortness of breath, chest pain, chest pressure or anxiety.  : C/o back pain. Stable renal function. No change. Making urine. ~ 300 cc this morning.    History taken from: patient    Objective     Vital Sign Min/Max for last 24 hours  Temp  Min: 97.8 °F (36.6 °C)  Max: 98.2 °F (36.8 °C)   BP  Min: 77/61  Max: 127/72   Pulse  Min: 78  Max: 106   Resp  Min: 16  Max: 20   SpO2  Min: 83 %  Max: 100 %   No data recorded   No data recorded     Flowsheet Rows      Flowsheet Row First Filed Value   Admission Height 167.6 cm (66\") Documented at 07/20/2023 1904   Admission Weight 106 kg (233 lb) Documented at 07/20/2023 1904            I/O this shift:  In: -   Out: 600 [Urine:600]  I/O last 3 completed shifts:  In: 4280.3 [I.V.:868.3; Blood:2412; IV Piggyback:1000]  Out: 2330 [Urine:1850; Other:480]    Objective:  General Appearance:  Comfortable.    Vital signs: (most recent): Blood pressure 111/66, pulse 87, temperature 97.8 °F (36.6 °C), temperature source Oral, resp. rate 18, height 167.6 cm (66\"), weight 106 kg (233 lb), SpO2 97 %.  Vital signs are normal.    Output: Producing urine.    HEENT: Normal HEENT exam.    Lungs:  Normal effort and normal respiratory rate.  Breath sounds clear to auscultation.  She is not in respiratory distress.  No stridor.  No decreased breath sounds or wheezes.    Heart: Normal rate.  Regular rhythm.  S1 normal and S2 normal.  No murmur or gallop.   Abdomen: Abdomen is soft.  Bowel sounds are normal.   There is no abdominal tenderness.     Extremities: Normal range of motion.  There is no dependent edema or local swelling.    Pulses: Distal pulses are intact.    Neurological: Patient is alert and oriented to person, place and time.  Normal strength.              Results Review:     I reviewed the patient's new " clinical results.    WBC WBC   Date Value Ref Range Status   07/22/2023 8.62 3.40 - 10.80 10*3/mm3 Final   07/21/2023 8.14 3.40 - 10.80 10*3/mm3 Final   07/20/2023 10.32 3.40 - 10.80 10*3/mm3 Final      HGB Hemoglobin   Date Value Ref Range Status   07/22/2023 8.9 (L) 12.0 - 15.9 g/dL Final   07/22/2023 8.9 (L) 12.0 - 15.9 g/dL Final   07/21/2023 9.3 (L) 12.0 - 15.9 g/dL Final   07/21/2023 11.1 (L) 12.0 - 15.9 g/dL Final   07/21/2023 6.8 (C) 12.0 - 15.9 g/dL Final   07/20/2023 4.3 (C) 12.0 - 15.9 g/dL Final      HCT Hematocrit   Date Value Ref Range Status   07/22/2023 26.4 (L) 34.0 - 46.6 % Final   07/22/2023 26.6 (L) 34.0 - 46.6 % Final   07/21/2023 27.4 (L) 34.0 - 46.6 % Final   07/21/2023 32.8 (L) 34.0 - 46.6 % Final   07/21/2023 20.4 (C) 34.0 - 46.6 % Final   07/20/2023 -15 (A) 38 - 51 % Final   07/20/2023 13.8 (C) 34.0 - 46.6 % Final      Platlets No results found for: LABPLAT   MCV MCV   Date Value Ref Range Status   07/22/2023 88.7 79.0 - 97.0 fL Final   07/21/2023 91.5 79.0 - 97.0 fL Final   07/20/2023 112.2 (H) 79.0 - 97.0 fL Final          Sodium Sodium   Date Value Ref Range Status   07/22/2023 139 136 - 145 mmol/L Final   07/21/2023 137 136 - 145 mmol/L Final   07/20/2023 136 136 - 145 mmol/L Final      Potassium Potassium   Date Value Ref Range Status   07/22/2023 4.4 3.5 - 5.2 mmol/L Final   07/21/2023 4.1 3.5 - 5.2 mmol/L Final   07/20/2023 4.0 3.5 - 5.2 mmol/L Final      Chloride Chloride   Date Value Ref Range Status   07/22/2023 108 (H) 98 - 107 mmol/L Final   07/21/2023 106 98 - 107 mmol/L Final   07/20/2023 105 98 - 107 mmol/L Final      CO2 CO2   Date Value Ref Range Status   07/22/2023 18.0 (L) 22.0 - 29.0 mmol/L Final   07/21/2023 17.0 (L) 22.0 - 29.0 mmol/L Final   07/20/2023 15.0 (L) 22.0 - 29.0 mmol/L Final      BUN BUN   Date Value Ref Range Status   07/22/2023 33 (H) 6 - 20 mg/dL Final   07/21/2023 33 (H) 6 - 20 mg/dL Final   07/20/2023 32 (H) 6 - 20 mg/dL Final      Creatinine  Creatinine   Date Value Ref Range Status   07/22/2023 4.22 (H) 0.57 - 1.00 mg/dL Final   07/21/2023 4.35 (H) 0.57 - 1.00 mg/dL Final   07/20/2023 5.30 mg/dL Final   07/20/2023 4.26 (H) 0.57 - 1.00 mg/dL Final      Calcium Calcium   Date Value Ref Range Status   07/22/2023 7.3 (L) 8.6 - 10.5 mg/dL Final   07/21/2023 7.2 (L) 8.6 - 10.5 mg/dL Final   07/20/2023 8.3 (L) 8.6 - 10.5 mg/dL Final      PO4 No results found for: CAPO4   Albumin Albumin   Date Value Ref Range Status   07/22/2023 2.8 (L) 3.5 - 5.2 g/dL Final   07/21/2023 2.6 (L) 3.5 - 5.2 g/dL Final   07/20/2023 3.3 (L) 3.5 - 5.2 g/dL Final      Magnesium Magnesium   Date Value Ref Range Status   07/22/2023 1.7 1.6 - 2.6 mg/dL Final   07/21/2023 1.9 1.6 - 2.6 mg/dL Final      Uric Acid No results found for: URICACID     Medication Review: yes    Assessment & Plan       Acute blood loss anemia    Vaginal bleeding    Acute renal failure    Generalized anxiety disorder      Assessment & Plan    1.  Acute kidney injury: Baseline creatinine unknown.  KEDAR in the setting of severe anemia and hypotension.  Patient admission creatinine 4.26.  Ultrasound showed bilateral cortical atrophic kidney.  Hypotension noted with blood pressure in the 80s.  Urine labs shows urine creatinine on 115, urine sodium 42 urine eos 0.  2.  Severe anemia: Vaginal bleed ongoing for a month.  3.  Hypotension: Likely volume depletion secondary to blood loss.  4.  Metabolic acidosis secondary to renal failure.  5.  Hypoalbuminemia     Plan recommendation:  Acute kidney injury likely ATN in the setting of severe anemia. Cr stable at present. Mild met acidosis noted. No uremic symptoms. No indication of dialysis. Will continue fluid challenge bicarb based fluids @ 75cc/hr x1 day. Daily labs. NO indication of dialysis at present.        Eliseo Mar MD  07/22/23  12:17 EDT

## 2023-07-22 NOTE — PLAN OF CARE
Goal Outcome Evaluation:   VSS. H&H stable. No bleeding has been observed since D&C. Bicarb gtt started per nephrology. Afebrile.  ml. BM x1. Ordered to tele. Daughter at bedside. Pt refuses to get up to chair, refuses to work with PT, refuses SCDS, and refuses to bathe, despite education on the importance of these interventions.

## 2023-07-22 NOTE — PLAN OF CARE
Goal Outcome Evaluation:  VSS on RA  UOP 1150 mL, improving. BM x1  H/H 9.3/27.4 from 11.1/32.8, No signs of bleeding throughout the night  Afebrile  Tylenol & Lidocaine patch given for lower back pain, pain has improved since.   Pt refusing many requests.   Melatonin given to help patient rest.

## 2023-07-22 NOTE — PROGRESS NOTES
7/22/2023    Name:Ian Romero   MR#:[id number]      GYN Progress Note       HD:2    Subjective   47 y.o.yo No obstetric history on file. Admitted with severe anemia secondary to a month of vaginal bleeding. She is s/p D&C yesterday. Her bleeding is now scant, and her Hct is stable after transfusion. She is also being treated for renal failure.         Patient Active Problem List    Diagnosis     *Acute blood loss anemia [D62]     Vaginal bleeding [N93.9]     Acute renal failure [N17.9]     Generalized anxiety disorder [F41.1]        Objective     Vital Signs Range for the last 24 hours  Temp: Temp:  [97.8 °F (36.6 °C)-98.2 °F (36.8 °C)] 97.8 °F (36.6 °C) Temp src: Oral  BP: BP: ()/(51-74) 105/64   BP Readings from Last 3 Encounters:   07/22/23 105/64     Pulse: Heart Rate:  [] 81   Pulse Readings from Last 3 Encounters:   07/22/23 81     Resp:  Resp:  [16-20] 16    I/O last 3 completed shifts:  In: 4280.3 [I.V.:868.3; Blood:2412; IV Piggyback:1000]  Out: 2330 [Urine:1850; Other:480]  I/O this shift:  In: -   Out: 600 [Urine:600]      Results from last 7 days   Lab Units 07/22/23  1149 07/22/23  0638 07/21/23  2355 07/21/23  1237 07/21/23  0517 07/20/23  1959 07/20/23  1953   WBC 10*3/mm3  --  8.62  --   --  8.14  --  10.32   HEMOGLOBIN g/dL 8.9* 8.9* 9.3*   < > 6.8*  --  4.3*   HEMATOCRIT % 26.4* 26.6* 27.4*   < > 20.4*  --  13.8*   HEMATOCRIT POC   --   --   --   --   --    < >  --    PLATELETS 10*3/mm3  --  190  --   --  201  --  296    < > = values in this interval not displayed.     Results from last 7 days   Lab Units 07/22/23  0638   SODIUM mmol/L 139   POTASSIUM mmol/L 4.4   CHLORIDE mmol/L 108*   CO2 mmol/L 18.0*   BUN mg/dL 33*   CREATININE mg/dL 4.22*   CALCIUM mg/dL 7.3*   BILIRUBIN mg/dL <0.2   ALK PHOS U/L 45   ALT (SGPT) U/L 7   AST (SGOT) U/L 8   GLUCOSE mg/dL 143*     Results from last 7 days   Lab Units 07/22/23  0638   SODIUM mmol/L 139   POTASSIUM mmol/L 4.4   CHLORIDE mmol/L 108*    CO2 mmol/L 18.0*   BUN mg/dL 33*   CREATININE mg/dL 4.22*   GLUCOSE mg/dL 143*   CALCIUM mg/dL 7.3*         Physical Exam:  General Appearance:  awake, alert, oriented, in no acute distress  Head/face:  NCAT    Extremities: NTTP, no edema, SCDS bilaterally      Assessment/Plan   1.  Acute blood loss anemia from AUB, s/p D&C POD 1. Bleeding now resolved and her Hct has been stable since last night. Pathology pending from endometrial currettings. If she has a return of heavy bleeding IV estrogen can be considered.  She will need FU with her primary gyn to manage her AUB longterm. She reports she plans to FU in [place] after discharge.          Lyssa Woodruff MD  7/22/2023 14:44 EDT

## 2023-07-22 NOTE — PROGRESS NOTES
Intensive Care Follow-up     Hospital:  LOS: 2 days   Ms. Ian Romero, 47 y.o. female is followed for:   Acute blood loss anemia            History of present illness:   Ian is a 47 y.o. female, who presented to this Hospital on 7/20/2023 because of fatigue, malaise, dyspnea, dizzy and pallor.      She is post-menopausal.     She has had vaginal bleeding for about a month. Currently about 1 pad/hour. She is passing large clot, reportedly the size of a baseball.     About a week ago, she saw a PCP ([facility]), for a routine check. She has not seen a physician in a long time, and she is not taking medicines on a regular basis. She got her lab work up. She was started on ativan and ropinirole for paresthesias in her feet.     The following day, 07/14/23, she was called and instructed to go to ED for evaluation. She was seen in the ED at [place]. Her Hb was Her vaginal US report indicated a normal size uterus and a 3.1 cm left ovarian cyst. A renal ultrasound showed bilateral renal cortical atrophy without hydronephrosis. She was discharged from the ED on Medroxyprogesterone 10 mg daily. Lab work-up while in ED included UA, PTT and PT but no chemistries or CBC.     After that she went to [place] with her family, but stayed in the room and she had no energy to go out. Tonight they came back and went straight to the ED for further evaluation.     She had already received 1 unit of PRBC and she has started to feel better.      Subjective   Interval History:  Patient doing better today.  Bleeding appears well controlled.  Hemoglobin stable.  She is having some anxiety issues right now.             The patient's past medical, surgical and social history were reviewed and updated in Epic as appropriate.       Objective     Infusions:     Medications:  lidocaine, 1 patch, Transdermal, Q24H  sertraline, 50 mg, Oral, Daily      I reviewed the patient's medications.    Vital Sign Min/Max for last 24 hours  Temp   Min: 96.8 °F (36 °C)  Max: 98.2 °F (36.8 °C)   BP  Min: 86/48  Max: 127/72   Pulse  Min: 80  Max: 106   Resp  Min: 16  Max: 20   SpO2  Min: 83 %  Max: 100 %   Flow (L/min)  Min: 2  Max: 5       Input/Output for last 24 hour shift  07/21 0701 - 07/22 0700  In: 1932 [I.V.:600]  Out: 1650 [Urine:1650]   S RR:  [6] 6  GENERAL : NAD, conversant  RESPIRATORY/THORAX : normal respiratory effort and no intercostal retractions, CTAB  CARDIOVASCULAR : Normal S1/S2, RRR. no lower ext edema.  GASTROINTESTINAL : Soft, NT/ND. BS x 4 normoactive. No hepatosplenomegaly.  MUSCULOSKELETAL : No cyanosis, clubbing, or ischemia  NEUROLOGICAL: alert and oriented to person, place and time  PSYCHOLOGICAL : Appropriate affect    Results from last 7 days   Lab Units 07/22/23  0638 07/21/23  2355 07/21/23  1237 07/21/23  0517 07/20/23 1953   WBC 10*3/mm3 8.62  --   --  8.14 10.32   HEMOGLOBIN g/dL 8.9* 9.3* 11.1* 6.8* 4.3*   PLATELETS 10*3/mm3 190  --   --  201 296     Results from last 7 days   Lab Units 07/22/23  0638 07/21/23  0518 07/20/23 1959 07/20/23 1953   SODIUM mmol/L 139 137  --  136   POTASSIUM mmol/L 4.4 4.1  --  4.0   CO2 mmol/L 18.0* 17.0*  --  15.0*   BUN mg/dL 33* 33*  --  32*   CREATININE mg/dL 4.22* 4.35* 5.30 4.26*   MAGNESIUM mg/dL 1.7 1.9  --   --    PHOSPHORUS mg/dL 4.3 6.1*  --   --    GLUCOSE mg/dL 143* 130*  --  138*     Estimated Creatinine Clearance: 19.9 mL/min (A) (by C-G formula based on SCr of 4.22 mg/dL (H)).    Results from last 7 days   Lab Units 07/20/23  2307   PH, ARTERIAL pH units 7.360   PCO2, ARTERIAL mm Hg 29.4*   PO2 ART mm Hg 84.8       I reviewed the patient's new clinical results.  I reviewed the patient's new imaging results/reports including actual images and agree with reports.         Assessment & Plan   Impression        Acute blood loss anemia    Vaginal bleeding    Acute renal failure    Generalized anxiety disorder       Plan        47-year-old female with no known past medical history.   Who presented to [facility] ICU on 7/21/2023 with acute blood loss anemia, hypotension and vaginal bleeding with acute renal failure.  She underwent a D&C on 7/21/2023.  No mass seen.  Bleeding appears better controlled on 7/22/2023.  Continue with significant renal failure.    -Vaginal bleeding per gynecology  -Continue to monitor renal function nephrology following up recommendations  -Hemoglobin every 6 hours, plan to transfuse for hemoglobin less than 7  -Patient complaining significant anxiety at this point time we will go ahead and start Xanax 0.5 mg twice daily as needed, will also start Zoloft.    -P.o. diet  -AM labs    I conducted multidisciplinary rounds in the plan of care was discussed with the multidisciplinary team at that time. In attendance at multidisciplinary rounds was clinical pharmacist, dietitian, nursing staff, and case management.    I discussed the patient's findings and my recommendations with patient and nursing staff     Alphonso Velasquez, DO  Pulmonary, Critical care and Sleep Medicine

## 2023-07-22 NOTE — SIGNIFICANT NOTE
"Patient noncompliant and rude with nursing staff. Took SpO2, BP cuff, and EKG leads off, refusing care and requesting to leave despite education of the medical risks. Pt stated that she wanted to be \"knocked out\", I explained to her that the NP was not comfortable with ordering medications (Ativan, Requip, etc.) that could worsen her kidney function. Pt became more compliant after the NP communicated with her in person and finally agreed to take melatonin to help her rest.  "

## 2023-07-23 LAB
ANION GAP SERPL CALCULATED.3IONS-SCNC: 13 MMOL/L (ref 5–15)
BASOPHILS # BLD AUTO: 0.03 10*3/MM3 (ref 0–0.2)
BASOPHILS NFR BLD AUTO: 0.5 % (ref 0–1.5)
BUN SERPL-MCNC: 35 MG/DL (ref 6–20)
BUN/CREAT SERPL: 9.4 (ref 7–25)
CALCIUM SPEC-SCNC: 7.4 MG/DL (ref 8.6–10.5)
CHLORIDE SERPL-SCNC: 109 MMOL/L (ref 98–107)
CO2 SERPL-SCNC: 19 MMOL/L (ref 22–29)
CREAT SERPL-MCNC: 3.72 MG/DL (ref 0.57–1)
DEPRECATED RDW RBC AUTO: 59.3 FL (ref 37–54)
EGFRCR SERPLBLD CKD-EPI 2021: 14.3 ML/MIN/1.73
EOSINOPHIL # BLD AUTO: 0.19 10*3/MM3 (ref 0–0.4)
EOSINOPHIL NFR BLD AUTO: 3 % (ref 0.3–6.2)
ERYTHROCYTE [DISTWIDTH] IN BLOOD BY AUTOMATED COUNT: 19.6 % (ref 12.3–15.4)
GLUCOSE SERPL-MCNC: 93 MG/DL (ref 65–99)
HCT VFR BLD AUTO: 27.9 % (ref 34–46.6)
HCT VFR BLD AUTO: 27.9 % (ref 34–46.6)
HGB BLD-MCNC: 9.2 G/DL (ref 12–15.9)
HGB BLD-MCNC: 9.3 G/DL (ref 12–15.9)
IMM GRANULOCYTES # BLD AUTO: 0.05 10*3/MM3 (ref 0–0.05)
IMM GRANULOCYTES NFR BLD AUTO: 0.8 % (ref 0–0.5)
LYMPHOCYTES # BLD AUTO: 2 10*3/MM3 (ref 0.7–3.1)
LYMPHOCYTES NFR BLD AUTO: 31.7 % (ref 19.6–45.3)
MAGNESIUM SERPL-MCNC: 1.7 MG/DL (ref 1.6–2.6)
MCH RBC QN AUTO: 29.5 PG (ref 26.6–33)
MCHC RBC AUTO-ENTMCNC: 33 G/DL (ref 31.5–35.7)
MCV RBC AUTO: 89.4 FL (ref 79–97)
MONOCYTES # BLD AUTO: 0.3 10*3/MM3 (ref 0.1–0.9)
MONOCYTES NFR BLD AUTO: 4.8 % (ref 5–12)
NEUTROPHILS NFR BLD AUTO: 3.74 10*3/MM3 (ref 1.7–7)
NEUTROPHILS NFR BLD AUTO: 59.2 % (ref 42.7–76)
NRBC BLD AUTO-RTO: 0 /100 WBC (ref 0–0.2)
PHOSPHATE SERPL-MCNC: 4.5 MG/DL (ref 2.5–4.5)
PLATELET # BLD AUTO: 203 10*3/MM3 (ref 140–450)
PMV BLD AUTO: 8.3 FL (ref 6–12)
POTASSIUM SERPL-SCNC: 4.5 MMOL/L (ref 3.5–5.2)
RBC # BLD AUTO: 3.12 10*6/MM3 (ref 3.77–5.28)
SODIUM SERPL-SCNC: 141 MMOL/L (ref 136–145)
WBC NRBC COR # BLD: 6.31 10*3/MM3 (ref 3.4–10.8)

## 2023-07-23 PROCEDURE — 99232 SBSQ HOSP IP/OBS MODERATE 35: CPT

## 2023-07-23 PROCEDURE — 80048 BASIC METABOLIC PNL TOTAL CA: CPT | Performed by: INTERNAL MEDICINE

## 2023-07-23 PROCEDURE — 99232 SBSQ HOSP IP/OBS MODERATE 35: CPT | Performed by: OBSTETRICS & GYNECOLOGY

## 2023-07-23 PROCEDURE — 83735 ASSAY OF MAGNESIUM: CPT | Performed by: INTERNAL MEDICINE

## 2023-07-23 PROCEDURE — 85025 COMPLETE CBC W/AUTO DIFF WBC: CPT | Performed by: INTERNAL MEDICINE

## 2023-07-23 PROCEDURE — 85018 HEMOGLOBIN: CPT

## 2023-07-23 PROCEDURE — 85014 HEMATOCRIT: CPT

## 2023-07-23 PROCEDURE — 84100 ASSAY OF PHOSPHORUS: CPT | Performed by: INTERNAL MEDICINE

## 2023-07-23 RX ORDER — MEDROXYPROGESTERONE ACETATE 10 MG/1
10 TABLET ORAL DAILY
Status: DISCONTINUED | OUTPATIENT
Start: 2023-07-23 | End: 2023-07-24 | Stop reason: HOSPADM

## 2023-07-23 RX ADMIN — ALPRAZOLAM 0.5 MG: 0.5 TABLET ORAL at 16:44

## 2023-07-23 RX ADMIN — MEDROXYPROGESTERONE ACETATE 10 MG: 10 TABLET ORAL at 14:22

## 2023-07-23 RX ADMIN — SODIUM BICARBONATE 150 MEQ: 84 INJECTION INTRAVENOUS at 20:03

## 2023-07-23 RX ADMIN — SODIUM BICARBONATE 150 MEQ: 84 INJECTION INTRAVENOUS at 05:34

## 2023-07-23 RX ADMIN — LIDOCAINE 1 PATCH: 50 PATCH CUTANEOUS at 21:34

## 2023-07-23 RX ADMIN — ACETAMINOPHEN 650 MG: 325 TABLET ORAL at 20:03

## 2023-07-23 NOTE — PLAN OF CARE
Goal Outcome Evaluation:  Plan of Care Reviewed With: patient      Patient VSS on room air.  No evidence of bleeding this shift.  Has remained NSR on tele and afebrile.  Sodium Bicarb infusion continued.  Expressed desire to leave facility AMA, but after conversation with provider patient now agreeable to stay through the night.  Patient currently resting comfortably with bed in lowest position and call light within reach.  Family to remain at bedside.  Care ongoing.

## 2023-07-23 NOTE — PLAN OF CARE
Goal Outcome Evaluation:  VSS on RA   mL, x1 unmeasured occ.   Afebrile  Vaginal bleeding started back ~0400, blood filled purewick, however H/H up from 8.9/26.4 to 9.2/27.9  Bicarb gtt infusing at 75 mL/hr per nephrology  Pt rested well throughout the night, PRN Xanax and Tylenol given. Son at bedside.  Tele orders

## 2023-07-23 NOTE — PROGRESS NOTES
Intensive Care Follow-up     Hospital:  LOS: 3 days   Ms. Ian Romero, 47 y.o. female is followed for:   Acute blood loss anemia        History of present illness:   Ms. Romero is a 47 y.o. female who presented to this Hospital on 7/20/2023 for evaluation of fatigue, malaise, dyspnea, dizzy and pallor.      She is post-menopausal.     She has had vaginal bleeding for about a month. Currently about 1 pad/hour. She is passing large clot, reportedly the size of a baseball.     About a week ago, she saw a PCP ([facility]), for a routine check. She has not seen a physician in a long time, and she is not taking medicines on a regular basis. She got her lab work up. She was started on ativan and ropinirole for paresthesias in her feet.     The following day, 07/14/23, she was called and instructed to go to ED for evaluation. She was seen in the ED at [place]. Her Hb was Her vaginal US report indicated a normal size uterus and a 3.1 cm left ovarian cyst. A renal ultrasound showed bilateral renal cortical atrophy without hydronephrosis. She was discharged from the ED on Medroxyprogesterone 10 mg daily. Lab work-up while in ED included UA, PTT and PT but no chemistries or CBC.     After that she went to [place] with her family, but stayed in the room and she had no energy to go out. Tonight they came back and went straight to the ED for further evaluation.     She was admitted to the ICU for further management.    Subjective   Interval History:  Patient doing better today.  light bleeding reported overnight.  Hemoglobin stable. States she is ready to go home. Denies nausea, vomiting, pain, shortness, of breath. VSS. Sodium bicarbonate infusing at 75 mL/hr. No complaints at this time.     The patient's past medical, surgical and social history were reviewed and updated in Epic as appropriate.       Objective     Infusions:  sodium bicarbonate drip (greater than 75 mEq/bag), 150 mEq, Last Rate: 150 mEq (07/23/23  0534)    Medications:  lidocaine, 1 patch, Transdermal, Q24H  sertraline, 50 mg, Oral, Daily      I reviewed the patient's medications.    Vital Sign Min/Max for last 24 hours  Temp  Min: 97.8 °F (36.6 °C)  Max: 98.5 °F (36.9 °C)   BP  Min: 77/61  Max: 127/78   Pulse  Min: 77  Max: 105   Resp  Min: 16  Max: 20   SpO2  Min: 93 %  Max: 99 %   No data recorded       Input/Output for last 24 hour shift  07/22 0701 - 07/23 0700  In: 1096.5 [I.V.:1096.5]  Out: 1450 [Urine:1450]      GENERAL : No acute distress, lying in bed, conversant  RESPIRATORY/THORAX : normal respiratory effort and no intercostal retractions, lungs clear to auscultation  CARDIOVASCULAR : Normal S1/S2, RRR. no lower ext edema.  GASTROINTESTINAL : Soft, NT/ND. BS x 4 normoactive. No hepatosplenomegaly.  MUSCULOSKELETAL : No cyanosis, clubbing, or ischemia  NEUROLOGICAL: alert and oriented to person, place and time  PSYCHOLOGICAL : Appropriate affect    Results from last 7 days   Lab Units 07/23/23  0441 07/22/23  1149 07/22/23  0638 07/21/23  1237 07/21/23  0517   WBC 10*3/mm3 6.31  --  8.62  --  8.14   HEMOGLOBIN g/dL 9.2* 8.9* 8.9*   < > 6.8*   PLATELETS 10*3/mm3 203  --  190  --  201    < > = values in this interval not displayed.       Results from last 7 days   Lab Units 07/23/23  0441 07/22/23  0638 07/21/23  0518   SODIUM mmol/L 141 139 137   POTASSIUM mmol/L 4.5 4.4 4.1   CO2 mmol/L 19.0* 18.0* 17.0*   BUN mg/dL 35* 33* 33*   CREATININE mg/dL 3.72* 4.22* 4.35*   MAGNESIUM mg/dL 1.7 1.7 1.9   PHOSPHORUS mg/dL 4.5 4.3 6.1*   GLUCOSE mg/dL 93 143* 130*       Estimated Creatinine Clearance: 22.5 mL/min (A) (by C-G formula based on SCr of 3.72 mg/dL (H)).    Results from last 7 days   Lab Units 07/20/23  2307   PH, ARTERIAL pH units 7.360   PCO2, ARTERIAL mm Hg 29.4*   PO2 ART mm Hg 84.8       Assessment & Plan   Impression        Acute blood loss anemia    Vaginal bleeding    Acute renal failure    Generalized anxiety disorder       Plan         47-year-old female with no known past medical history.  Who presented to [facility] ICU on 7/21/2023 with acute blood loss anemia, hypotension and vaginal bleeding with acute renal failure.  She underwent a D&C on 7/21/2023.  No mass seen.  Bleeding appears better controlled on 7/22/2023.  Continue with significant renal failure, though creatinine improving. Nephrology following.     -Vaginal bleeding per gynecology  -Continue to monitor renal function nephrology following up recommendations  -Hemoglobin every 6 hours, plan to transfuse for hemoglobin less than 7  -Continue Xanax 0.5 mg twice daily as needed for anxiety  Continue Zoloft  -PO diet  -Aggressive pulmonary toileting  -AM labs    Dispo: Transfer to telemetry    Time spent: 35 minutes  I conducted multidisciplinary rounds in the plan of care was discussed with the multidisciplinary team at that time. In attendance at multidisciplinary rounds was clinical pharmacist, dietitian, nursing staff, and case management.    I discussed the patient's findings and my recommendations with patient and nursing staff     Maricruz Hodge, MSN, APRN, ACNPC-AG  Pulmonary and Critical Care Medicine  Electronically signed by JESSICA Bradley, 07/23/23, 8:03 AM EDT.

## 2023-07-23 NOTE — PROGRESS NOTES
Elis Romero  : 1975  MRN: [id number]  [facility]: [id number]    Hospital Day: 4  Subjective   Patient is feeling well and denies any further bleeding or abdominal pain.  Patient was transferred from the ICU unit to 6 floor telemetry.     Objective     Min/max vitals past 24 hours:   Temp  Min: 98 °F (36.7 °C)  Max: 98.5 °F (36.9 °C)  BP  Min: 101/60  Max: 127/78  Pulse  Min: 77  Max: 104  Pulse  Min: 77  Max: 104        I/O last 3 completed shifts:  In: 1096.5 [I.V.:1096.5]  Out: 2600 [Urine:2600]    General: well developed; well nourished  no acute distress   Abdomen: soft, non-tender; no masses  no umbilical or inguinal hernias are present  no hepato-splenomegaly   Pelvic: Not performed   Ext: Calves NT     Lab Results   Component Value Date    WBC 6.31 2023    HGB 9.3 (L) 2023    HCT 27.9 (L) 2023    MCV 89.4 2023     2023     2023    K 4.5 2023     (H) 2023    CO2 19.0 (L) 2023    BUN 35 (H) 2023    CREATININE 3.72 (H) 2023    GLUCOSE 93 2023    ALBUMIN 2.8 (L) 2023    CALCIUM 7.4 (L) 2023    AST 8 2023    ALT 7 2023    BILITOT <0.2 2023    LIPASE 59 2023          Assessment   Perimenopausal vaginal bleeding with fair anemia requiring transfusion.  Bleeding has stopped after D&C.  Pathology report from D&C is pending.  Recommended cycling with Provera to prevent recurrence of dysfunctional bleeding.  Anemia stable hematocrit 27.9%.  Renal failure.  Likely due to anemia.  Creatinine improved today to 3.7.  Morbid obesity.  Patient will be at higher risk of endometrial hyperplasia and endometrial cancer.      Plan   Rx Provera 10 mg for 14 days; she is to wait 14 days and then repeat.  Recommended that she continue doing this until she is menopausal and no longer having withdrawal menses.  At that point she can space it out to every 3 to 6 months.  Okay to discharge from  GYN standpoint.  Follow-up with Dr. Dr. Nguyễn in 2 weeks.    Ricardo Barnhart MD  7/23/2023  13:48 EDT

## 2023-07-23 NOTE — PROGRESS NOTES
" LOS: 3 days   Patient Care Team:  Addison Yepez MD as PCP - General (Internal Medicine)    Chief Complaint: Acute renal failure    Subjective:  Symptoms:  Stable.  No shortness of breath, chest pain, chest pressure or anxiety.  :     Wants to go home. Slow improvement in renal function noted. Currently on IV fluids. PO intake ok    History taken from: patient    Objective     Vital Sign Min/Max for last 24 hours  Temp  Min: 98 °F (36.7 °C)  Max: 98.5 °F (36.9 °C)   BP  Min: 101/60  Max: 127/78   Pulse  Min: 77  Max: 104   Resp  Min: 16  Max: 20   SpO2  Min: 93 %  Max: 98 %   No data recorded   No data recorded     Flowsheet Rows      Flowsheet Row First Filed Value   Admission Height 167.6 cm (66\") Documented at 07/20/2023 1904   Admission Weight 106 kg (233 lb) Documented at 07/20/2023 1904            I/O this shift:  In: 360 [P.O.:360]  Out: 800 [Urine:800]  I/O last 3 completed shifts:  In: 1096.5 [I.V.:1096.5]  Out: 2600 [Urine:2600]    Objective:  General Appearance:  Comfortable.    Vital signs: (most recent): Blood pressure 123/72, pulse 97, temperature 98 °F (36.7 °C), temperature source Oral, resp. rate 20, height 167.6 cm (66\"), weight 106 kg (233 lb), SpO2 97 %.  Vital signs are normal.    Output: Producing urine.    HEENT: Normal HEENT exam.    Lungs:  Normal effort and normal respiratory rate.  Breath sounds clear to auscultation.  She is not in respiratory distress.  No stridor.  No decreased breath sounds or wheezes.    Heart: Normal rate.  Regular rhythm.  S1 normal and S2 normal.  No murmur or gallop.   Abdomen: Abdomen is soft.  Bowel sounds are normal.   There is no abdominal tenderness.     Extremities: Normal range of motion.  There is no dependent edema or local swelling.    Pulses: Distal pulses are intact.    Neurological: Patient is alert and oriented to person, place and time.  Normal strength.              Results Review:     I reviewed the patient's new clinical results.    WBC " WBC   Date Value Ref Range Status   07/23/2023 6.31 3.40 - 10.80 10*3/mm3 Final   07/22/2023 8.62 3.40 - 10.80 10*3/mm3 Final   07/21/2023 8.14 3.40 - 10.80 10*3/mm3 Final   07/20/2023 10.32 3.40 - 10.80 10*3/mm3 Final      HGB Hemoglobin   Date Value Ref Range Status   07/23/2023 9.3 (L) 12.0 - 15.9 g/dL Final   07/23/2023 9.2 (L) 12.0 - 15.9 g/dL Final   07/22/2023 8.9 (L) 12.0 - 15.9 g/dL Final   07/22/2023 8.9 (L) 12.0 - 15.9 g/dL Final   07/21/2023 9.3 (L) 12.0 - 15.9 g/dL Final   07/21/2023 11.1 (L) 12.0 - 15.9 g/dL Final   07/21/2023 6.8 (C) 12.0 - 15.9 g/dL Final   07/20/2023 4.3 (C) 12.0 - 15.9 g/dL Final      HCT Hematocrit   Date Value Ref Range Status   07/23/2023 27.9 (L) 34.0 - 46.6 % Final   07/23/2023 27.9 (L) 34.0 - 46.6 % Final   07/22/2023 26.4 (L) 34.0 - 46.6 % Final   07/22/2023 26.6 (L) 34.0 - 46.6 % Final   07/21/2023 27.4 (L) 34.0 - 46.6 % Final   07/21/2023 32.8 (L) 34.0 - 46.6 % Final   07/21/2023 20.4 (C) 34.0 - 46.6 % Final   07/20/2023 -15 (A) 38 - 51 % Final   07/20/2023 13.8 (C) 34.0 - 46.6 % Final      Platlets No results found for: LABPLAT   MCV MCV   Date Value Ref Range Status   07/23/2023 89.4 79.0 - 97.0 fL Final   07/22/2023 88.7 79.0 - 97.0 fL Final   07/21/2023 91.5 79.0 - 97.0 fL Final   07/20/2023 112.2 (H) 79.0 - 97.0 fL Final          Sodium Sodium   Date Value Ref Range Status   07/23/2023 141 136 - 145 mmol/L Final   07/22/2023 139 136 - 145 mmol/L Final   07/21/2023 137 136 - 145 mmol/L Final   07/20/2023 136 136 - 145 mmol/L Final      Potassium Potassium   Date Value Ref Range Status   07/23/2023 4.5 3.5 - 5.2 mmol/L Final     Comment:     Slight hemolysis detected by analyzer. Results may be affected.   07/22/2023 4.4 3.5 - 5.2 mmol/L Final   07/21/2023 4.1 3.5 - 5.2 mmol/L Final   07/20/2023 4.0 3.5 - 5.2 mmol/L Final      Chloride Chloride   Date Value Ref Range Status   07/23/2023 109 (H) 98 - 107 mmol/L Final   07/22/2023 108 (H) 98 - 107 mmol/L Final    07/21/2023 106 98 - 107 mmol/L Final   07/20/2023 105 98 - 107 mmol/L Final      CO2 CO2   Date Value Ref Range Status   07/23/2023 19.0 (L) 22.0 - 29.0 mmol/L Final   07/22/2023 18.0 (L) 22.0 - 29.0 mmol/L Final   07/21/2023 17.0 (L) 22.0 - 29.0 mmol/L Final   07/20/2023 15.0 (L) 22.0 - 29.0 mmol/L Final      BUN BUN   Date Value Ref Range Status   07/23/2023 35 (H) 6 - 20 mg/dL Final   07/22/2023 33 (H) 6 - 20 mg/dL Final   07/21/2023 33 (H) 6 - 20 mg/dL Final   07/20/2023 32 (H) 6 - 20 mg/dL Final      Creatinine Creatinine   Date Value Ref Range Status   07/23/2023 3.72 (H) 0.57 - 1.00 mg/dL Final   07/22/2023 4.22 (H) 0.57 - 1.00 mg/dL Final   07/21/2023 4.35 (H) 0.57 - 1.00 mg/dL Final   07/20/2023 5.30 mg/dL Final   07/20/2023 4.26 (H) 0.57 - 1.00 mg/dL Final      Calcium Calcium   Date Value Ref Range Status   07/23/2023 7.4 (L) 8.6 - 10.5 mg/dL Final   07/22/2023 7.3 (L) 8.6 - 10.5 mg/dL Final   07/21/2023 7.2 (L) 8.6 - 10.5 mg/dL Final   07/20/2023 8.3 (L) 8.6 - 10.5 mg/dL Final      PO4 No results found for: CAPO4   Albumin Albumin   Date Value Ref Range Status   07/22/2023 2.8 (L) 3.5 - 5.2 g/dL Final   07/21/2023 2.6 (L) 3.5 - 5.2 g/dL Final   07/20/2023 3.3 (L) 3.5 - 5.2 g/dL Final      Magnesium Magnesium   Date Value Ref Range Status   07/23/2023 1.7 1.6 - 2.6 mg/dL Final   07/22/2023 1.7 1.6 - 2.6 mg/dL Final   07/21/2023 1.9 1.6 - 2.6 mg/dL Final      Uric Acid No results found for: URICACID     Medication Review: yes    Assessment & Plan       Acute blood loss anemia    Vaginal bleeding    Acute renal failure    Generalized anxiety disorder      Assessment & Plan    1.  Acute kidney injury: Baseline creatinine unknown.  KEDAR in the setting of severe anemia and hypotension.  Patient admission creatinine 4.26.  Ultrasound showed bilateral cortical atrophic kidney.  Hypotension noted with blood pressure in the 80s.  Urine labs shows urine creatinine on 115, urine sodium 42 urine eos 0.  2.  Severe  anemia: Vaginal bleed ongoing for a month S/p D&C  3.  Hypotension: Likely volume depletion secondary to blood loss.  4.  Metabolic acidosis secondary to renal failure.  5.  Hypoalbuminemia     Plan recommendation:  Acute kidney injury likely ATN in the setting of severe anemia. Improvement in renal function noted. Patient wants to go home. Defer the decision to primary service. If discharge patient will need f/u in renal clinic 7-10 days. I advised her to stay overnight and continue w IV fluids till tomorrow.. Daily labs. NO indication of dialysis at present.        Eliseo Mar MD  07/23/23  13:37 EDT

## 2023-07-24 VITALS
BODY MASS INDEX: 37.45 KG/M2 | SYSTOLIC BLOOD PRESSURE: 126 MMHG | TEMPERATURE: 98.1 F | RESPIRATION RATE: 18 BRPM | HEART RATE: 82 BPM | OXYGEN SATURATION: 95 % | WEIGHT: 233 LBS | HEIGHT: 66 IN | DIASTOLIC BLOOD PRESSURE: 73 MMHG

## 2023-07-24 PROBLEM — N93.9 VAGINAL BLEEDING: Status: RESOLVED | Noted: 2023-07-22 | Resolved: 2023-07-24

## 2023-07-24 LAB
ANA SER QL: NEGATIVE
ANION GAP SERPL CALCULATED.3IONS-SCNC: 12 MMOL/L (ref 5–15)
BUN SERPL-MCNC: 30 MG/DL (ref 6–20)
BUN/CREAT SERPL: 9.9 (ref 7–25)
CALCIUM SPEC-SCNC: 7.7 MG/DL (ref 8.6–10.5)
CHLORIDE SERPL-SCNC: 103 MMOL/L (ref 98–107)
CO2 SERPL-SCNC: 25 MMOL/L (ref 22–29)
CREAT SERPL-MCNC: 3.04 MG/DL (ref 0.57–1)
CYTO UR: NORMAL
DSDNA AB SER-ACNC: 1 IU/ML (ref 0–9)
EGFRCR SERPLBLD CKD-EPI 2021: 18.2 ML/MIN/1.73
GLUCOSE SERPL-MCNC: 129 MG/DL (ref 65–99)
LAB AP CASE REPORT: NORMAL
LAB AP CLINICAL INFORMATION: NORMAL
PATH REPORT.FINAL DX SPEC: NORMAL
PATH REPORT.GROSS SPEC: NORMAL
POTASSIUM SERPL-SCNC: 4.1 MMOL/L (ref 3.5–5.2)
SODIUM SERPL-SCNC: 140 MMOL/L (ref 136–145)

## 2023-07-24 PROCEDURE — 80048 BASIC METABOLIC PNL TOTAL CA: CPT | Performed by: STUDENT IN AN ORGANIZED HEALTH CARE EDUCATION/TRAINING PROGRAM

## 2023-07-24 RX ORDER — MEDROXYPROGESTERONE ACETATE 10 MG/1
10 TABLET ORAL DAILY
Qty: 30 TABLET | Refills: 0 | Status: SHIPPED | OUTPATIENT
Start: 2023-07-25 | End: 2023-08-06

## 2023-07-24 RX ADMIN — ACETAMINOPHEN 650 MG: 325 TABLET ORAL at 04:52

## 2023-07-24 RX ADMIN — MEDROXYPROGESTERONE ACETATE 10 MG: 10 TABLET ORAL at 08:36

## 2023-07-24 NOTE — PROGRESS NOTES
" LOS: 4 days   Patient Care Team:  Addison Yepez MD as PCP - General (Internal Medicine)    Chief Complaint: Acute renal failure    Subjective:  Symptoms:  Stable.  No shortness of breath, chest pain, chest pressure or anxiety.  :     Slow improvement in renal function wants to go home.     History taken from: patient    Objective     Vital Sign Min/Max for last 24 hours  Temp  Min: 97.9 °F (36.6 °C)  Max: 98.9 °F (37.2 °C)   BP  Min: 109/72  Max: 126/73   Pulse  Min: 79  Max: 89   Resp  Min: 16  Max: 20   SpO2  Min: 95 %  Max: 100 %   No data recorded   No data recorded     Flowsheet Rows      Flowsheet Row First Filed Value   Admission Height 167.6 cm (66\") Documented at 07/20/2023 1904   Admission Weight 106 kg (233 lb) Documented at 07/20/2023 1904            I/O this shift:  In: 300 [I.V.:300]  Out: -   I/O last 3 completed shifts:  In: 3979.5 [P.O.:1260; I.V.:2719.5]  Out: 2850 [Urine:2850]    Objective:  General Appearance:  Comfortable.    Vital signs: (most recent): Blood pressure 126/73, pulse 82, temperature 98.1 °F (36.7 °C), temperature source Oral, resp. rate 18, height 167.6 cm (66\"), weight 106 kg (233 lb), SpO2 95 %.  Vital signs are normal.    Output: Producing urine.    HEENT: Normal HEENT exam.    Lungs:  Normal effort and normal respiratory rate.  Breath sounds clear to auscultation.  She is not in respiratory distress.  No stridor.  No decreased breath sounds or wheezes.    Heart: Normal rate.  Regular rhythm.  S1 normal and S2 normal.  No murmur or gallop.   Abdomen: Abdomen is soft.  Bowel sounds are normal.   There is no abdominal tenderness.     Extremities: Normal range of motion.  There is no dependent edema or local swelling.    Pulses: Distal pulses are intact.    Neurological: Patient is alert and oriented to person, place and time.  Normal strength.              Results Review:     I reviewed the patient's new clinical results.    WBC WBC   Date Value Ref Range Status "   07/23/2023 6.31 3.40 - 10.80 10*3/mm3 Final   07/22/2023 8.62 3.40 - 10.80 10*3/mm3 Final      HGB Hemoglobin   Date Value Ref Range Status   07/23/2023 9.3 (L) 12.0 - 15.9 g/dL Final   07/23/2023 9.2 (L) 12.0 - 15.9 g/dL Final   07/22/2023 8.9 (L) 12.0 - 15.9 g/dL Final   07/22/2023 8.9 (L) 12.0 - 15.9 g/dL Final   07/21/2023 9.3 (L) 12.0 - 15.9 g/dL Final   07/21/2023 11.1 (L) 12.0 - 15.9 g/dL Final      HCT Hematocrit   Date Value Ref Range Status   07/23/2023 27.9 (L) 34.0 - 46.6 % Final   07/23/2023 27.9 (L) 34.0 - 46.6 % Final   07/22/2023 26.4 (L) 34.0 - 46.6 % Final   07/22/2023 26.6 (L) 34.0 - 46.6 % Final   07/21/2023 27.4 (L) 34.0 - 46.6 % Final   07/21/2023 32.8 (L) 34.0 - 46.6 % Final      Platlets No results found for: LABPLAT   MCV MCV   Date Value Ref Range Status   07/23/2023 89.4 79.0 - 97.0 fL Final   07/22/2023 88.7 79.0 - 97.0 fL Final          Sodium Sodium   Date Value Ref Range Status   07/24/2023 140 136 - 145 mmol/L Final   07/23/2023 141 136 - 145 mmol/L Final   07/22/2023 139 136 - 145 mmol/L Final      Potassium Potassium   Date Value Ref Range Status   07/24/2023 4.1 3.5 - 5.2 mmol/L Final   07/23/2023 4.5 3.5 - 5.2 mmol/L Final     Comment:     Slight hemolysis detected by analyzer. Results may be affected.   07/22/2023 4.4 3.5 - 5.2 mmol/L Final      Chloride Chloride   Date Value Ref Range Status   07/24/2023 103 98 - 107 mmol/L Final   07/23/2023 109 (H) 98 - 107 mmol/L Final   07/22/2023 108 (H) 98 - 107 mmol/L Final      CO2 CO2   Date Value Ref Range Status   07/24/2023 25.0 22.0 - 29.0 mmol/L Final   07/23/2023 19.0 (L) 22.0 - 29.0 mmol/L Final   07/22/2023 18.0 (L) 22.0 - 29.0 mmol/L Final      BUN BUN   Date Value Ref Range Status   07/24/2023 30 (H) 6 - 20 mg/dL Final   07/23/2023 35 (H) 6 - 20 mg/dL Final   07/22/2023 33 (H) 6 - 20 mg/dL Final      Creatinine Creatinine   Date Value Ref Range Status   07/24/2023 3.04 (H) 0.57 - 1.00 mg/dL Final   07/23/2023 3.72 (H) 0.57 -  1.00 mg/dL Final   07/22/2023 4.22 (H) 0.57 - 1.00 mg/dL Final      Calcium Calcium   Date Value Ref Range Status   07/24/2023 7.7 (L) 8.6 - 10.5 mg/dL Final   07/23/2023 7.4 (L) 8.6 - 10.5 mg/dL Final   07/22/2023 7.3 (L) 8.6 - 10.5 mg/dL Final      PO4 No results found for: CAPO4   Albumin Albumin   Date Value Ref Range Status   07/22/2023 2.8 (L) 3.5 - 5.2 g/dL Final      Magnesium Magnesium   Date Value Ref Range Status   07/23/2023 1.7 1.6 - 2.6 mg/dL Final   07/22/2023 1.7 1.6 - 2.6 mg/dL Final      Uric Acid No results found for: URICACID     Medication Review: yes    Assessment & Plan       Acute blood loss anemia    Acute renal failure    Generalized anxiety disorder      Assessment & Plan    1.  Acute kidney injury: Baseline creatinine unknown.  KEDAR in the setting of severe anemia and hypotension.  Patient admission creatinine 4.26.  Ultrasound showed bilateral cortical atrophic kidney.  Hypotension noted with blood pressure in the 80s.  Urine labs shows urine creatinine on 115, urine sodium 42 urine eos 0.  2.  Severe anemia: Vaginal bleed ongoing for a month S/p D&C  3.  Hypotension: Likely volume depletion secondary to blood loss.  4.  Metabolic acidosis secondary to renal failure.  5.  Hypoalbuminemia     Plan recommendation:  Acute kidney injury likely ATN in the setting of severe anemia. Improvement in renal function noted. Patient wants to go home. Conservative care otherwise. Avoid NSAIDs. Needs appt in renal clinic 7-10 days.        Eliseo Mar MD  07/24/23  12:23 EDT

## 2023-07-24 NOTE — CASE MANAGEMENT/SOCIAL WORK
Continued Stay Note   [place]     Patient Name: Ian Romero  MRN: [id number]  Today's Date: 7/24/2023    Admit Date: 7/20/2023    Plan: Home   Discharge Plan       Row Name 07/24/23 1147       Plan    Plan Home    Patient/Family in Agreement with Plan yes    Plan Comments Patient was screened by [facility] on July 20th due to being a self pay. Per Nini, patient was deemed over income for Medicaid services. [facility] explained to her at that time that she did not qualify for Medicaid services. CM following.    Final Discharge Disposition Code 01 - home or self-care                   Discharge Codes    No documentation.                 Expected Discharge Date and Time       Expected Discharge Date Expected Discharge Time    Jul 28, 2023               Ivonne Herndon RN

## 2023-07-24 NOTE — PLAN OF CARE
Problem: Adult Inpatient Plan of Care  Goal: Plan of Care Review  Outcome: Ongoing, Progressing  Goal: Patient-Specific Goal (Individualized)  Outcome: Ongoing, Progressing  Goal: Absence of Hospital-Acquired Illness or Injury  Outcome: Ongoing, Progressing  Intervention: Identify and Manage Fall Risk  Recent Flowsheet Documentation  Taken 7/24/2023 0800 by Kiara Puga RN  Safety Promotion/Fall Prevention: safety round/check completed  Intervention: Prevent Skin Injury  Recent Flowsheet Documentation  Taken 7/24/2023 0800 by Kiara Puga RN  Body Position: position changed independently  Intervention: Prevent and Manage VTE (Venous Thromboembolism) Risk  Recent Flowsheet Documentation  Taken 7/24/2023 0800 by Kiara Puga RN  Activity Management: up ad kodak  Goal: Optimal Comfort and Wellbeing  Outcome: Ongoing, Progressing  Goal: Readiness for Transition of Care  Outcome: Ongoing, Progressing     Problem: Skin Injury Risk Increased  Goal: Skin Health and Integrity  Outcome: Ongoing, Progressing     Problem: Anemia  Goal: Anemia Symptom Improvement  Outcome: Ongoing, Progressing  Intervention: Monitor and Manage Anemia  Recent Flowsheet Documentation  Taken 7/24/2023 0800 by Kiara Puga RN  Safety Promotion/Fall Prevention: safety round/check completed     Problem: Fluid and Electrolyte Imbalance (Acute Kidney Injury/Impairment)  Goal: Fluid and Electrolyte Balance  Outcome: Ongoing, Progressing     Problem: Oral Intake Inadequate (Acute Kidney Injury/Impairment)  Goal: Optimal Nutrition Intake  Outcome: Ongoing, Progressing     Problem: Renal Function Impairment (Acute Kidney Injury/Impairment)  Goal: Effective Renal Function  Outcome: Ongoing, Progressing   Goal Outcome Evaluation:

## 2023-07-25 ENCOUNTER — READMISSION MANAGEMENT (OUTPATIENT)
Dept: CALL CENTER | Facility: HOSPITAL | Age: 48
End: 2023-07-25

## 2023-07-25 LAB
MYELOPEROXIDASE AB SER IA-ACNC: <0.2 UNITS (ref 0–0.9)
PROTEINASE3 AB SER IA-ACNC: <0.2 UNITS (ref 0–0.9)

## 2023-07-25 NOTE — OUTREACH NOTE
Prep Survey      Flowsheet Row Responses   [facility] facility patient discharged from? [facility]   Is LACE score < 7 ? No   Eligibility Readm Mgmt   Discharge diagnosis Acute blood loss anemia, vaginal bleeding, s/p D&C, ARF   Does the patient have one of the following disease processes/diagnoses(primary or secondary)? General Surgery   Does the patient have Home health ordered? No   Is there a DME ordered? No   Prep survey completed? Yes            Sabrina PIERCE - Registered Nurse

## 2023-08-01 ENCOUNTER — READMISSION MANAGEMENT (OUTPATIENT)
Dept: CALL CENTER | Facility: HOSPITAL | Age: 48
End: 2023-08-01

## 2023-08-04 ENCOUNTER — READMISSION MANAGEMENT (OUTPATIENT)
Dept: CALL CENTER | Facility: HOSPITAL | Age: 48
End: 2023-08-04

## 2024-01-31 ENCOUNTER — HOSPITAL ENCOUNTER (EMERGENCY)
Facility: HOSPITAL | Age: 49
Discharge: HOME | End: 2024-01-31
Attending: EMERGENCY MEDICINE | Admitting: EMERGENCY MEDICINE
Payer: MEDICAID

## 2024-01-31 VITALS
SYSTOLIC BLOOD PRESSURE: 144 MMHG | TEMPERATURE: 97.9 F | WEIGHT: 202 LBS | HEIGHT: 66 IN | HEART RATE: 102 BPM | OXYGEN SATURATION: 98 % | DIASTOLIC BLOOD PRESSURE: 101 MMHG | RESPIRATION RATE: 18 BRPM | BODY MASS INDEX: 32.47 KG/M2

## 2024-01-31 DIAGNOSIS — N18.9 ACUTE KIDNEY INJURY SUPERIMPOSED ON CHRONIC KIDNEY DISEASE: Primary | ICD-10-CM

## 2024-01-31 DIAGNOSIS — I10 UNCONTROLLED HYPERTENSION: ICD-10-CM

## 2024-01-31 DIAGNOSIS — N17.9 ACUTE KIDNEY INJURY SUPERIMPOSED ON CHRONIC KIDNEY DISEASE: Primary | ICD-10-CM

## 2024-01-31 LAB
ALBUMIN SERPL-MCNC: 4.5 G/DL (ref 3.5–5.2)
ALBUMIN/GLOB SERPL: 1.3 G/DL
ALP SERPL-CCNC: 59 U/L (ref 39–117)
ALT SERPL W P-5'-P-CCNC: 11 U/L (ref 1–33)
ANION GAP SERPL CALCULATED.3IONS-SCNC: 14 MMOL/L (ref 5–15)
AST SERPL-CCNC: 18 U/L (ref 1–32)
BACTERIA UR QL AUTO: ABNORMAL /HPF
BASOPHILS # BLD AUTO: 0.04 10*3/MM3 (ref 0–0.2)
BASOPHILS NFR BLD AUTO: 0.4 % (ref 0–1.5)
BILIRUB SERPL-MCNC: 0.3 MG/DL (ref 0–1.2)
BILIRUB UR QL STRIP: NEGATIVE
BUN BLDA-MCNC: 38 MG/DL
BUN BLDA-MCNC: 38 MG/DL (ref 8–26)
BUN SERPL-MCNC: 37 MG/DL (ref 6–20)
BUN/CREAT SERPL: 10.3 (ref 7–25)
CA-I BLDA-SCNC: 1.28 MMOL/L (ref 1.2–1.32)
CALCIUM BLD QL: 1.28 MG/DL
CALCIUM SPEC-SCNC: 9.2 MG/DL (ref 8.6–10.5)
CHLORIDE BLDA-SCNC: 110 MMOL/L (ref 98–109)
CHLORIDE BLDA-SCNC: 110 MMOL/L (ref 98–109)
CHLORIDE SERPL-SCNC: 109 MMOL/L (ref 98–107)
CLARITY UR: CLEAR
CO2 BLDA-SCNC: 21 MMOL/L (ref 24–29)
CO2 SERPL-SCNC: 19 MMOL/L (ref 22–29)
COLOR UR: YELLOW
CREAT BLDA-MCNC: 4.3 MG/DL (ref 0.6–1.3)
CREAT BLDA-MCNC: 4.3 MG/DL (ref 0.6–1.3)
CREAT SERPL-MCNC: 3.6 MG/DL (ref 0.57–1)
DEPRECATED RDW RBC AUTO: 50.7 FL (ref 37–54)
EGFRCR SERPLBLD CKD-EPI 2021: 11.9 ML/MIN/1.73
EGFRCR SERPLBLD CKD-EPI 2021: 14.8 ML/MIN/1.73
EOSINOPHIL # BLD AUTO: 0.27 10*3/MM3 (ref 0–0.4)
EOSINOPHIL NFR BLD AUTO: 2.9 % (ref 0.3–6.2)
ERYTHROCYTE [DISTWIDTH] IN BLOOD BY AUTOMATED COUNT: 13.5 % (ref 12.3–15.4)
GLOBULIN UR ELPH-MCNC: 3.4 GM/DL
GLUCOSE BLDC GLUCOMTR-MCNC: 97 MG/DL (ref 70–130)
GLUCOSE BLDC GLUCOMTR-MCNC: 97 MG/DL (ref 70–130)
GLUCOSE SERPL-MCNC: 99 MG/DL (ref 65–99)
GLUCOSE UR STRIP-MCNC: ABNORMAL MG/DL
HCT VFR BLD AUTO: 44.1 % (ref 34–46.6)
HCT VFR BLDA CALC: 43 % (ref 38–51)
HCT VFR BLDA CALC: 43 % (ref 38–51)
HGB BLD-MCNC: 14.9 G/DL (ref 12–15.9)
HGB BLDA-MCNC: 14.6 G/DL (ref 12–17)
HGB BLDA-MCNC: 14.6 G/DL (ref 12–17)
HGB UR QL STRIP.AUTO: ABNORMAL
HYALINE CASTS UR QL AUTO: ABNORMAL /LPF
IMM GRANULOCYTES # BLD AUTO: 0.03 10*3/MM3 (ref 0–0.05)
IMM GRANULOCYTES NFR BLD AUTO: 0.3 % (ref 0–0.5)
KETONES UR QL STRIP: NEGATIVE
LEUKOCYTE ESTERASE UR QL STRIP.AUTO: NEGATIVE
LYMPHOCYTES # BLD AUTO: 2.35 10*3/MM3 (ref 0.7–3.1)
LYMPHOCYTES NFR BLD AUTO: 25.2 % (ref 19.6–45.3)
MAGNESIUM SERPL-MCNC: 1.8 MG/DL (ref 1.6–2.6)
MCH RBC QN AUTO: 34.5 PG (ref 26.6–33)
MCHC RBC AUTO-ENTMCNC: 33.8 G/DL (ref 31.5–35.7)
MCV RBC AUTO: 102.1 FL (ref 79–97)
MONOCYTES # BLD AUTO: 0.52 10*3/MM3 (ref 0.1–0.9)
MONOCYTES NFR BLD AUTO: 5.6 % (ref 5–12)
NEUTROPHILS NFR BLD AUTO: 6.11 10*3/MM3 (ref 1.7–7)
NEUTROPHILS NFR BLD AUTO: 65.6 % (ref 42.7–76)
NITRITE UR QL STRIP: NEGATIVE
NRBC BLD AUTO-RTO: 0 /100 WBC (ref 0–0.2)
PH UR STRIP.AUTO: 5.5 [PH] (ref 5–8)
PHOSPHATE SERPL-MCNC: 4.6 MG/DL (ref 2.5–4.5)
PLATELET # BLD AUTO: 250 10*3/MM3 (ref 140–450)
PMV BLD AUTO: 8.2 FL (ref 6–12)
POTASSIUM BLDA-SCNC: 4.3 MMOL/L (ref 3.5–4.9)
POTASSIUM BLDA-SCNC: 4.3 MMOL/L (ref 3.5–4.9)
POTASSIUM SERPL-SCNC: 4.4 MMOL/L (ref 3.5–5.2)
PROT SERPL-MCNC: 7.9 G/DL (ref 6–8.5)
PROT UR QL STRIP: ABNORMAL
RBC # BLD AUTO: 4.32 10*6/MM3 (ref 3.77–5.28)
RBC # UR STRIP: ABNORMAL /HPF
REF LAB TEST METHOD: ABNORMAL
SODIUM BLD-SCNC: 141 MMOL/L (ref 138–146)
SODIUM BLD-SCNC: 141 MMOL/L (ref 138–146)
SODIUM SERPL-SCNC: 142 MMOL/L (ref 136–145)
SP GR UR STRIP: 1.01 (ref 1–1.03)
SQUAMOUS #/AREA URNS HPF: ABNORMAL /HPF
UROBILINOGEN UR QL STRIP: ABNORMAL
WBC # UR STRIP: ABNORMAL /HPF
WBC NRBC COR # BLD AUTO: 9.32 10*3/MM3 (ref 3.4–10.8)

## 2024-01-31 PROCEDURE — 80047 BASIC METABLC PNL IONIZED CA: CPT

## 2024-01-31 PROCEDURE — 85025 COMPLETE CBC W/AUTO DIFF WBC: CPT | Performed by: EMERGENCY MEDICINE

## 2024-01-31 PROCEDURE — 81001 URINALYSIS AUTO W/SCOPE: CPT | Performed by: EMERGENCY MEDICINE

## 2024-01-31 PROCEDURE — 99283 EMERGENCY DEPT VISIT LOW MDM: CPT

## 2024-01-31 PROCEDURE — 84100 ASSAY OF PHOSPHORUS: CPT | Performed by: EMERGENCY MEDICINE

## 2024-01-31 PROCEDURE — 80053 COMPREHEN METABOLIC PANEL: CPT | Performed by: EMERGENCY MEDICINE

## 2024-01-31 PROCEDURE — 36415 COLL VENOUS BLD VENIPUNCTURE: CPT

## 2024-01-31 PROCEDURE — 85014 HEMATOCRIT: CPT

## 2024-01-31 PROCEDURE — 83735 ASSAY OF MAGNESIUM: CPT | Performed by: EMERGENCY MEDICINE

## 2024-02-01 NOTE — DISCHARGE INSTRUCTIONS
I spoke with Dr. Cohn.  He is on-call nephrology and kindly agrees to see you tomorrow.  You need to call his office at 8 AM to schedule the same-day appointment.  Let his office staff know that I, Dr. Fajardo, spoke with Dr. Cohn during your emergency department visit.    Talk with Dr. Cohn about your blood pressure and what medication he may want you on.

## 2024-02-01 NOTE — ED PROVIDER NOTES
EMERGENCY DEPARTMENT ENCOUNTER    Pt Name: Ian Romero  MRN: [id number]  Pt :   1975  Room Number:  VR03/V3  Date of encounter:  2024  PCP: Jalyn Nathan  ED Provider: Elvis Fajardo MD    Historian: Patient and her daughter      HPI:  Chief Complaint: Kidney failure        Context: Ian Romero is a 48 y.o. female who presents to the ED c/o reported kidney failure.  The patient had routine labs drawn 2 days ago by her PCP.  Today she was told that she had kidney failure and high potassium.  She was instructed to go directly to an emergency department.  Patient reports a previous history of kidney problems.  She was admitted to our facility back in 2023.  She was instructed to follow-up with the nephrologist on an outpatient basis but she does not have insurance and does not believe that she is eligible for getting medical insurance and therefore did not follow-up as directed.  The patient denies pain, dyspnea, new onset swelling.  The patient was prescribed blood pressure medication 2 days ago during the office visit but has not filled the medication because she is nervous about taking more than 1 pill at a time.  She is already taking sertraline.  She has not been prescribed any other medications for renal failure or otherwise.  The patient continues to smoke roughly 2 packs of cigarettes per day.  She lives [place] 45 minutes from our hospital.  Htn but hasn't started med      PAST MEDICAL HISTORY  No past medical history on file.      PAST SURGICAL HISTORY  Past Surgical History:   Procedure Laterality Date    D & C WITH SUCTION N/A 2023    Procedure: DILATATION AND CURETTAGE WITH SUCTION;  Surgeon: Jono Nguyễn MD;  Location: [facility];  Service: Obstetrics/Gynecology;  Laterality: N/A;         FAMILY HISTORY  No family history on file.      SOCIAL HISTORY  Social History     Socioeconomic History    Marital status:    Tobacco Use    Smoking status: Every  Day     Packs/day: 1.00     Years: 15.00     Additional pack years: 0.00     Total pack years: 15.00     Types: Cigarettes     Start date: 1990     Passive exposure: Current    Smokeless tobacco: Never   Vaping Use    Vaping Use: Never used   Substance and Sexual Activity    Alcohol use: Not Currently    Drug use: Never    Sexual activity: Defer         ALLERGIES  Augmentin [amoxicillin-pot clavulanate]        REVIEW OF SYSTEMS  Review of Systems       All systems reviewed and negative except for those discussed in HPI.       PHYSICAL EXAM    I have reviewed the triage vital signs and nursing notes.    ED Triage Vitals [01/31/24 1916]   Temp Heart Rate Resp BP SpO2   97.9 °F (36.6 °C) 102 18 (!) 153/107 98 %      Temp src Heart Rate Source Patient Position BP Location FiO2 (%)   Oral Monitor Sitting Right arm --       Physical Exam  GENERAL:   Appears anxious but otherwise in no acute distress.   HENT: Nares patent.  EYES: No scleral icterus.  CV: Regular rhythm, tachycardic rate.  No murmurs gallops rubs  RESPIRATORY: Normal effort.  No audible wheezes, rales or rhonchi.  Clear to auscultation in all fields anteriorly and posteriorly  ABDOMEN: Soft, nontender nontender  MUSCULOSKELETAL: No deformities.   NEURO: Alert, moves all extremities, follows commands.  SKIN: Warm, dry, no rash visualized.      LAB RESULTS  Recent Results (from the past 24 hour(s))   Urinalysis With Microscopic If Indicated (No Culture) - Urine, Clean Catch    Collection Time: 01/31/24  8:26 PM    Specimen: Urine, Clean Catch   Result Value Ref Range    Color, UA Yellow Yellow, Straw    Appearance, UA Clear Clear    pH, UA 5.5 5.0 - 8.0    Specific Gravity, UA 1.013 1.001 - 1.030    Glucose,  mg/dL (Trace) (A) Negative    Ketones, UA Negative Negative    Bilirubin, UA Negative Negative    Blood, UA Trace (A) Negative    Protein, UA >=300 mg/dL (3+) (A) Negative    Leuk Esterase, UA Negative Negative    Nitrite, UA Negative Negative     Urobilinogen, UA 0.2 E.U./dL 0.2 - 1.0 E.U./dL   Urinalysis, Microscopic Only - Urine, Clean Catch    Collection Time: 01/31/24  8:26 PM    Specimen: Urine, Clean Catch   Result Value Ref Range    RBC, UA 3-5 (A) None Seen, 0-2 /HPF    WBC, UA 0-2 None Seen, 0-2 /HPF    Bacteria, UA None Seen None Seen, Trace /HPF    Squamous Epithelial Cells, UA 0-2 None Seen, 0-2 /HPF    Hyaline Casts, UA 0-6 0 - 6 /LPF    Methodology Automated Microscopy    CBC Auto Differential    Collection Time: 01/31/24  8:34 PM    Specimen: Blood   Result Value Ref Range    WBC 9.32 3.40 - 10.80 10*3/mm3    RBC 4.32 3.77 - 5.28 10*6/mm3    Hemoglobin 14.9 12.0 - 15.9 g/dL    Hematocrit 44.1 34.0 - 46.6 %    .1 (H) 79.0 - 97.0 fL    MCH 34.5 (H) 26.6 - 33.0 pg    MCHC 33.8 31.5 - 35.7 g/dL    RDW 13.5 12.3 - 15.4 %    RDW-SD 50.7 37.0 - 54.0 fl    MPV 8.2 6.0 - 12.0 fL    Platelets 250 140 - 450 10*3/mm3    Neutrophil % 65.6 42.7 - 76.0 %    Lymphocyte % 25.2 19.6 - 45.3 %    Monocyte % 5.6 5.0 - 12.0 %    Eosinophil % 2.9 0.3 - 6.2 %    Basophil % 0.4 0.0 - 1.5 %    Immature Grans % 0.3 0.0 - 0.5 %    Neutrophils, Absolute 6.11 1.70 - 7.00 10*3/mm3    Lymphocytes, Absolute 2.35 0.70 - 3.10 10*3/mm3    Monocytes, Absolute 0.52 0.10 - 0.90 10*3/mm3    Eosinophils, Absolute 0.27 0.00 - 0.40 10*3/mm3    Basophils, Absolute 0.04 0.00 - 0.20 10*3/mm3    Immature Grans, Absolute 0.03 0.00 - 0.05 10*3/mm3    nRBC 0.0 0.0 - 0.2 /100 WBC   POC Chem 8    Collection Time: 01/31/24  8:35 PM    Specimen: Blood   Result Value Ref Range    Sodium 141 138 - 146 mmol/L    POC Potassium 4.3 3.5 - 4.9 mmol/L    Chloride 110 (A) 98 - 109 mmol/L    Calcium, Arterial 1.28 mg/dL    Glucose 97 70 - 130 mg/dL    BUN 38 mg/dL    Creatinine 4.30 (A) 0.60 - 1.30 mg/dL    Hemoglobin 14.6 12.0 - 17.0 g/dL    Hematocrit 43 38 - 51 %   POC CHEM 8    Collection Time: 01/31/24  8:36 PM    Specimen: Blood   Result Value Ref Range    Glucose 97 70 - 130 mg/dL    BUN 38  (H) 8 - 26 mg/dL    Creatinine 4.30 (H) 0.60 - 1.30 mg/dL    Sodium 141 138 - 146 mmol/L    POC Potassium 4.3 3.5 - 4.9 mmol/L    Chloride 110 (H) 98 - 109 mmol/L    Total CO2 21 (L) 24 - 29 mmol/L    Hemoglobin 14.6 12.0 - 17.0 g/dL    Hematocrit 43 38 - 51 %    Ionized Calcium 1.28 1.20 - 1.32 mmol/L    eGFR 11.9 (L) >60.0 mL/min/1.73       If labs were ordered, I independently reviewed the results and considered them in treating the patient.        RADIOLOGY  No Radiology Exams Resulted Within Past 24 Hours          PROCEDURES    Procedures    No orders to display       MEDICATIONS GIVEN IN ER    Medications - No data to display      MEDICAL DECISION MAKING, PROGRESS, and CONSULTS    All labs, if obtained, have been independently reviewed by me.  All radiology studies, if obtained, have been reviewed by me and the radiologist dictating the report.  All EKG's, if obtained, have been independently viewed and interpreted by me/my attending physician.      Discussion below represents my analysis of pertinent findings related to patient's condition, differential diagnosis, treatment plan and final disposition.                         Differential diagnosis:    Acute versus acute on chronic chronic kidney failure.  Uncontrolled, untreated hypertension.      Additional sources:    - Discussed/ obtained information from independent historians: Patient's daughter is with the patient and gives me information directly.    - External (non-ED) record review: I reviewed multiple records on this patient to include outside labs which the patient brought with her.  See details and ED course.    I reviewed discharge summary from our facility dated 7/24/2023 when the patient was admitted for acute blood loss anemia secondary to vaginal bleeding along with acute renal failure and generalized anxiety disorder.    I reviewed Dr. Nancy Shaw's nephrology consultation note dated 7/23/2023.  Acute kidney injury with bilateral cortical  atrophy of the kidneys.  Urine creatinine was 115.  Urine sodium was 42.  Urine eosinophils were 0.  He felt that the patient's acute kidney injury was likely secondary to acute tubular necrosis.  No indication for dialysis at present.    - Chronic or social conditions impacting care: Smokes roughly 2 packs of cigarettes per day.    - Shared decision making: Patient currently in agreement with plans for evaluation.  She really wishes to be discharged home if possible but will defer to my recommendation.      Orders placed during this visit:  Orders Placed This Encounter   Procedures    Comprehensive Metabolic Panel    CBC Auto Differential    Urinalysis With Microscopic If Indicated (No Culture) - Urine, Clean Catch    Phosphorus    Magnesium    Urinalysis, Microscopic Only - Urine, Clean Catch    POC Chem 8    POC CHEM 8    CBC & Differential         Additional orders considered but not ordered:  Renal ultrasound.    ED Course:    Consultants:      ED Course as of 01/31/24 2130 Wed Jan 31, 2024 2022 I have asked our  [facility] to see if she can find a way for this patient to follow-up either on an inpatient and outpatient basis.  Currently the patient has no insurance and she tells me that she does not believe she is eligible for any insurance.  This is the reason for her noncompliance with follow-up with a nephrologist.  Praveen our registration worker has submitted her for [employer]. [MS]   2026 Paged Dr. Cohn, nephrology on-call to discuss the case further. [MS]   2029 I reviewed multiple records on this patient to include outpatient laboratories brought by the patient on paper from 2 days ago as well as prior laboratories drawn in July 2023.  See below.    2 d ago bun/cr = 44/4.1  Gfr 13  7/21  33/4.35  7/23 35/3.72  7/24/2023  bun/cr 30/3.04 [MS]   2050 I have paged Dr. Cohn again. [MS]   2051 Creatinine(!): 4.30 [MS]   2051 Potassium: 4.3 [MS]   2103 I spoke with Dr. Cohn in detail.  We  discussed the patient's laboratories including those from July 2023 as well as more recent ones to include 2 days ago and this evening.  The patient definitely does not wish to be admitted and we both agree that we can avoid this.  Dr. Cohn kindly offered to see the patient tomorrow in his office.  She understands the need to call his office first thing, 8 AM, to schedule same-day appointment. [MS]   2114 Point-of-care Chem-8 showed a creatinine of 4.3 however I am happy to see that her laboratory analyzed creatinine is only 3.6.  I spoke with the patient and her daughter in detail about findings, recommendations, need to call Dr. Cohn first thing tomorrow morning. [MS]      ED Course User Index  [MS] Elvis Fajardo MD              Shared Decision Making:  After my consideration of clinical presentation and any laboratory/radiology studies obtained, I discussed the findings with the patient/patient representative who is in agreement with the treatment plan and the final disposition.   Risks and benefits of discharge and/or observation/admission were discussed.       AS OF 21:05 EST VITALS:    BP - (!) 144/101  HR - 102  TEMP - 97.9 °F (36.6 °C) (Oral)  O2 SATS - 98%                  DIAGNOSIS  Final diagnoses:   Acute kidney injury superimposed on chronic kidney disease   Uncontrolled hypertension         DISPOSITION  DISCHARGE    Patient discharged in stable condition.    Reviewed implications of results, diagnosis, meds, responsibility to follow up, warning signs and symptoms of possible worsening, potential complications and reasons to return to ER.    Patient/Family voiced understanding of above instructions.    Discussed plan for discharge, as there is no emergent indication for admission.  Pt/family is agreeable and understands need for follow up and possible repeat testing.  Pt/family is aware that discharge does not mean that nothing is wrong but that it indicates no emergency is currently present that  requires admission and they must continue care with follow-up as given below or with a physician of their choice.     FOLLOW-UP  LalitMarika MD  [address] [facility] [address]  [place] [address]  [phone]    In 1 day  call at 8 am for same day appointment    Jalyn Nathan  [address]  [facility] [id number] [phone]               Medication List      No changes were made to your prescriptions during this visit.             Please note that portions of this document were completed with voice recognition software.        Elvis Fajardo MD  02/01/24 [id number]

## 2024-02-01 NOTE — CASE MANAGEMENT/SOCIAL WORK
Continued Stay Note  [facility]     Patient Name: Ian Romero  MRN: [id number]  Today's Date: 1/31/2024    Admit Date: (Not on file)    Plan: Insurance   Discharge Plan       Row Name 01/31/24 2038       Plan    Plan Insurance    Patient/Family in Agreement with Plan yes    Plan Comments I was approached by Dr. Fajardo for Ms Romero.  Ms Romero has a history of kidney disease, but does not have insurance.  Unfortunately, this means that she cannot follow up with doctors in order to treat her kidney disease. Ms Romero states that she has been screened for Medicaid before, but unfortunately makes too much money. I called and spoke with [employer]. Brielle states that at this time she cannot see Ms Romero on the board.  I spoke with adalid Sherman.  Whit has marked her for review for medicaid.  At this time, there is not anything I can do regarding insurance.  I have given Ms Romero a handout for [employer], which is a website she can utilize to find private commercial insurance ([employer], [employer], etc).  No other needs were discussed at this time.    I called Brielle with [employer] back to see if she can see Ms Romero on her list.  I was advised by Brielle that she has three days to screen Ms Romero for Medicaid after her visit to the ER.      Final Discharge Disposition Code 01 - home or self-care                   Discharge Codes    No documentation.                       Fauzia Clark RN

## 2024-05-17 ENCOUNTER — TRANSCRIBE ORDERS (OUTPATIENT)
Dept: ADMINISTRATIVE | Facility: HOSPITAL | Age: 49
End: 2024-05-17

## 2024-05-17 DIAGNOSIS — Z12.2 SCREENING FOR MALIGNANT NEOPLASM OF RESPIRATORY ORGAN: Primary | ICD-10-CM

## 2024-07-23 ENCOUNTER — TRANSCRIBE ORDERS (OUTPATIENT)
Dept: ADMINISTRATIVE | Facility: HOSPITAL | Age: 49
End: 2024-07-23

## 2024-07-23 DIAGNOSIS — Z12.2 ENCOUNTER FOR SCREENING FOR MALIGNANT NEOPLASM OF RESPIRATORY ORGANS: Primary | ICD-10-CM

## 2024-07-25 ENCOUNTER — TRANSCRIBE ORDERS (OUTPATIENT)
Dept: ADMINISTRATIVE | Facility: HOSPITAL | Age: 49
End: 2024-07-25

## 2024-07-25 DIAGNOSIS — Z12.2 SCREENING FOR MALIGNANT NEOPLASM OF RESPIRATORY ORGAN: Primary | ICD-10-CM

## 2024-07-26 ENCOUNTER — TRANSCRIBE ORDERS (OUTPATIENT)
Dept: ADMINISTRATIVE | Facility: HOSPITAL | Age: 49
End: 2024-07-26

## 2024-07-26 DIAGNOSIS — Z12.2 ENCOUNTER FOR SCREENING FOR MALIGNANT NEOPLASM OF RESPIRATORY ORGANS: Primary | ICD-10-CM

## 2024-08-16 ENCOUNTER — TRANSCRIBE ORDERS (OUTPATIENT)
Dept: ADMINISTRATIVE | Facility: HOSPITAL | Age: 49
End: 2024-08-16

## 2024-08-16 DIAGNOSIS — Z12.2 ENCOUNTER FOR SCREENING FOR MALIGNANT NEOPLASM OF RESPIRATORY ORGANS: Primary | ICD-10-CM

## 2024-10-18 RX ORDER — SODIUM, POTASSIUM,MAG SULFATES 17.5-3.13G
1 SOLUTION, RECONSTITUTED, ORAL ORAL TAKE AS DIRECTED
Qty: 354 ML | Refills: 0 | Status: SHIPPED | OUTPATIENT
Start: 2024-10-18

## 2025-01-17 ENCOUNTER — PREP FOR SURGERY (OUTPATIENT)
Dept: OTHER | Facility: HOSPITAL | Age: 50
End: 2025-01-17

## 2025-01-17 RX ORDER — SODIUM CHLORIDE 0.9 % (FLUSH) 0.9 %
10 SYRINGE (ML) INJECTION AS NEEDED
OUTPATIENT
Start: 2025-01-17

## 2025-01-21 ENCOUNTER — TELEPHONE (OUTPATIENT)
Dept: INFUSION THERAPY | Facility: HOSPITAL | Age: 50
End: 2025-01-21

## 2025-01-21 RX ORDER — ERGOCALCIFEROL (VITAMIN D2) 10 MCG
400 TABLET ORAL DAILY
COMMUNITY

## 2025-01-21 RX ORDER — SEVELAMER HYDROCHLORIDE 800 MG/1
800 TABLET, FILM COATED ORAL
COMMUNITY

## 2025-01-21 RX ORDER — DILTIAZEM HYDROCHLORIDE 300 MG/1
360 CAPSULE, COATED, EXTENDED RELEASE ORAL DAILY
COMMUNITY

## 2025-01-21 RX ORDER — HYDRALAZINE HYDROCHLORIDE 50 MG/1
50 TABLET, FILM COATED ORAL 3 TIMES DAILY
COMMUNITY

## 2025-01-21 RX ORDER — PREGABALIN 50 MG/1
50 CAPSULE ORAL 3 TIMES DAILY
COMMUNITY

## 2025-01-21 NOTE — TELEPHONE ENCOUNTER
Pt contacted as pre-procedure phone call prior to planned (dialysis access) for (01/23/25). Reviewed with patient arrival time, location, nothing to eat or drink by mouth, okay to take blood pressure medications morning of procedure with a small sip of water, (blood thinners addressed?),  needed, reviewed procedure instructions and allowed time for questions, and reviewed home medications, allergies, and medical history.

## 2025-01-23 ENCOUNTER — HOSPITAL ENCOUNTER (OUTPATIENT)
Dept: INTERVENTIONAL RADIOLOGY/VASCULAR | Facility: HOSPITAL | Age: 50
Discharge: HOME | End: 2025-01-23

## 2025-01-23 ENCOUNTER — DOCUMENTATION (OUTPATIENT)
Dept: NEPHROLOGY | Facility: HOSPITAL | Age: 50
End: 2025-01-23

## 2025-01-23 VITALS
OXYGEN SATURATION: 94 % | DIASTOLIC BLOOD PRESSURE: 77 MMHG | TEMPERATURE: 98.2 F | BODY MASS INDEX: 36.64 KG/M2 | HEIGHT: 66 IN | HEART RATE: 96 BPM | SYSTOLIC BLOOD PRESSURE: 150 MMHG | WEIGHT: 228 LBS | RESPIRATION RATE: 22 BRPM

## 2025-01-23 DIAGNOSIS — N18.5 CHRONIC KIDNEY DISEASE, STAGE V: ICD-10-CM

## 2025-01-23 LAB
ANION GAP SERPL CALCULATED.3IONS-SCNC: 12 MMOL/L (ref 5–15)
BASOPHILS # BLD AUTO: 0.05 10*3/MM3 (ref 0–0.2)
BASOPHILS NFR BLD AUTO: 0.7 % (ref 0–1.5)
BUN SERPL-MCNC: 34 MG/DL (ref 6–20)
BUN/CREAT SERPL: 6.4 (ref 7–25)
CALCIUM SPEC-SCNC: 9.2 MG/DL (ref 8.6–10.5)
CHLORIDE SERPL-SCNC: 107 MMOL/L (ref 98–107)
CO2 SERPL-SCNC: 22 MMOL/L (ref 22–29)
CREAT SERPL-MCNC: 5.31 MG/DL (ref 0.57–1)
DEPRECATED RDW RBC AUTO: 53.1 FL (ref 37–54)
EGFRCR SERPLBLD CKD-EPI 2021: 9.2 ML/MIN/1.73
EOSINOPHIL # BLD AUTO: 0.2 10*3/MM3 (ref 0–0.4)
EOSINOPHIL NFR BLD AUTO: 3 % (ref 0.3–6.2)
ERYTHROCYTE [DISTWIDTH] IN BLOOD BY AUTOMATED COUNT: 14.5 % (ref 12.3–15.4)
GLUCOSE SERPL-MCNC: 81 MG/DL (ref 65–99)
HCT VFR BLD AUTO: 36.3 % (ref 34–46.6)
HGB BLD-MCNC: 12.1 G/DL (ref 12–15.9)
IMM GRANULOCYTES # BLD AUTO: 0.02 10*3/MM3 (ref 0–0.05)
IMM GRANULOCYTES NFR BLD AUTO: 0.3 % (ref 0–0.5)
INR PPP: 1.01 (ref 0.89–1.12)
LYMPHOCYTES # BLD AUTO: 1.58 10*3/MM3 (ref 0.7–3.1)
LYMPHOCYTES NFR BLD AUTO: 23.7 % (ref 19.6–45.3)
MCH RBC QN AUTO: 33.2 PG (ref 26.6–33)
MCHC RBC AUTO-ENTMCNC: 33.3 G/DL (ref 31.5–35.7)
MCV RBC AUTO: 99.5 FL (ref 79–97)
MONOCYTES # BLD AUTO: 0.37 10*3/MM3 (ref 0.1–0.9)
MONOCYTES NFR BLD AUTO: 5.5 % (ref 5–12)
NEUTROPHILS NFR BLD AUTO: 4.45 10*3/MM3 (ref 1.7–7)
NEUTROPHILS NFR BLD AUTO: 66.8 % (ref 42.7–76)
NRBC BLD AUTO-RTO: 0 /100 WBC (ref 0–0.2)
PLATELET # BLD AUTO: 208 10*3/MM3 (ref 140–450)
PMV BLD AUTO: 8.3 FL (ref 6–12)
POTASSIUM SERPL-SCNC: 6 MMOL/L (ref 3.5–5.2)
PROTHROMBIN TIME: 13.4 SECONDS (ref 12.2–14.5)
RBC # BLD AUTO: 3.65 10*6/MM3 (ref 3.77–5.28)
SODIUM SERPL-SCNC: 141 MMOL/L (ref 136–145)
WBC NRBC COR # BLD AUTO: 6.67 10*3/MM3 (ref 3.4–10.8)

## 2025-01-23 PROCEDURE — 76937 US GUIDE VASCULAR ACCESS: CPT

## 2025-01-23 PROCEDURE — C1894 INTRO/SHEATH, NON-LASER: HCPCS

## 2025-01-23 PROCEDURE — 25010000002 LIDOCAINE 1% - EPINEPHRINE 1:100000 1 %-1:100000 SOLUTION

## 2025-01-23 PROCEDURE — 25010000002 CLINDAMYCIN 600 MG/50ML SOLUTION: Performed by: RADIOLOGY

## 2025-01-23 PROCEDURE — 85025 COMPLETE CBC W/AUTO DIFF WBC: CPT | Performed by: NURSE PRACTITIONER

## 2025-01-23 PROCEDURE — 80048 BASIC METABOLIC PNL TOTAL CA: CPT | Performed by: NURSE PRACTITIONER

## 2025-01-23 PROCEDURE — 77001 FLUOROGUIDE FOR VEIN DEVICE: CPT

## 2025-01-23 PROCEDURE — 36558 INSERT TUNNELED CV CATH: CPT

## 2025-01-23 PROCEDURE — C1769 GUIDE WIRE: HCPCS

## 2025-01-23 PROCEDURE — 99152 MOD SED SAME PHYS/QHP 5/>YRS: CPT

## 2025-01-23 PROCEDURE — 25010000002 FENTANYL CITRATE (PF) 50 MCG/ML SOLUTION: Performed by: RADIOLOGY

## 2025-01-23 PROCEDURE — C1750 CATH, HEMODIALYSIS,LONG-TERM: HCPCS

## 2025-01-23 PROCEDURE — 85610 PROTHROMBIN TIME: CPT | Performed by: NURSE PRACTITIONER

## 2025-01-23 PROCEDURE — 25010000002 HEPARIN (PORCINE) PER 1000 UNITS

## 2025-01-23 PROCEDURE — 25010000002 MIDAZOLAM PER 1 MG: Performed by: RADIOLOGY

## 2025-01-23 RX ORDER — MIDAZOLAM HYDROCHLORIDE 1 MG/ML
INJECTION, SOLUTION INTRAMUSCULAR; INTRAVENOUS AS NEEDED
Status: COMPLETED | OUTPATIENT
Start: 2025-01-23 | End: 2025-01-23

## 2025-01-23 RX ORDER — MIDAZOLAM HYDROCHLORIDE 1 MG/ML
INJECTION, SOLUTION INTRAMUSCULAR; INTRAVENOUS
Status: DISPENSED
Start: 2025-01-23 | End: 2025-01-23

## 2025-01-23 RX ORDER — FENTANYL CITRATE 50 UG/ML
INJECTION, SOLUTION INTRAMUSCULAR; INTRAVENOUS AS NEEDED
Status: COMPLETED | OUTPATIENT
Start: 2025-01-23 | End: 2025-01-23

## 2025-01-23 RX ORDER — LIDOCAINE HYDROCHLORIDE AND EPINEPHRINE 10; 10 MG/ML; UG/ML
INJECTION, SOLUTION INFILTRATION; PERINEURAL
Status: COMPLETED
Start: 2025-01-23 | End: 2025-01-23

## 2025-01-23 RX ORDER — FENTANYL CITRATE 50 UG/ML
INJECTION, SOLUTION INTRAMUSCULAR; INTRAVENOUS
Status: DISPENSED
Start: 2025-01-23 | End: 2025-01-23

## 2025-01-23 RX ORDER — CLINDAMYCIN PHOSPHATE 600 MG/50ML
600 INJECTION, SOLUTION INTRAVENOUS ONCE
Status: COMPLETED | OUTPATIENT
Start: 2025-01-23 | End: 2025-01-23

## 2025-01-23 RX ORDER — HEPARIN SODIUM 1000 [USP'U]/ML
INJECTION, SOLUTION INTRAVENOUS; SUBCUTANEOUS
Status: COMPLETED
Start: 2025-01-23 | End: 2025-01-23

## 2025-01-23 RX ORDER — SODIUM CHLORIDE 0.9 % (FLUSH) 0.9 %
10 SYRINGE (ML) INJECTION AS NEEDED
Status: DISCONTINUED | OUTPATIENT
Start: 2025-01-23 | End: 2025-01-24 | Stop reason: HOSPADM

## 2025-01-23 RX ADMIN — SODIUM ZIRCONIUM CYCLOSILICATE 10 G: 10 POWDER, FOR SUSPENSION ORAL at 12:48

## 2025-01-23 RX ADMIN — CLINDAMYCIN PHOSPHATE 600 MG: 600 INJECTION, SOLUTION INTRAVENOUS at 11:38

## 2025-01-23 RX ADMIN — HEPARIN SODIUM 4100 UNITS: 1000 INJECTION INTRAVENOUS; SUBCUTANEOUS at 11:43

## 2025-01-23 RX ADMIN — MIDAZOLAM HYDROCHLORIDE 1 MG: 1 INJECTION, SOLUTION INTRAMUSCULAR; INTRAVENOUS at 11:33

## 2025-01-23 RX ADMIN — LIDOCAINE HYDROCHLORIDE AND EPINEPHRINE 9 ML: 10; 10 INJECTION, SOLUTION INFILTRATION; PERINEURAL at 11:43

## 2025-01-23 RX ADMIN — FENTANYL CITRATE 50 MCG: 50 INJECTION, SOLUTION INTRAMUSCULAR; INTRAVENOUS at 11:33

## 2025-01-23 NOTE — NURSING NOTE
RIJ tunneled dialysis catheter inserted per Dr Morrow. Patient tolerated procedure well. 1mg of versed and 50mcg of fenanyl administered for a total sedation time of 12 minutes. VSS. Patient A&Ox4. Site sutured and dressing applied with no signs of complications. Report given to DESIRAE MARSHALL.

## 2025-01-23 NOTE — DISCHARGE INSTRUCTIONS
Avoid any strenuous activities, pulling, tugging, straining or lifting anything over 10 pounds for the next 48 HOURS.    The dialysis nurses will care for your dialysis access and do dressing changes. Keep dressing clean and dry.         DO NOT DRIVE ON THE DAY OF YOUR PROCEDURE.    If you have any problems or concern please contact your physician's office.        Get help right away if:  Your catheter gets pulled out, breaks, tears, or leaks.  There's bleeding at your catheter that you can't stop.  You have chest pain or your heart feels like it's beating fast or skipping beats.  You have trouble breathing.

## 2025-01-23 NOTE — PRE-PROCEDURE NOTE
[facility]   Vascular Interventional Radiology  History & Physicial        Patient Name:Ian Romero    : 1975    MRN: [id number]    Primary Care Physician: Jalyn Nathan APRN    Referring Physician: Marika Cohn MD     Date of admission: 2025    Subjective     Reason for Consult: TDC placement    History of Present Illness     Ian Romero is a 49 y.o. female referred to IR as noted above.      Active Hospital Problems:  There are no active hospital problems to display for this patient.      Personal History     Past Medical History:   Diagnosis Date    Anxiety     Chronic kidney disease     Hypertension        Past Surgical History:   Procedure Laterality Date    CHOLECYSTECTOMY      D & C WITH SUCTION N/A 2023    Procedure: DILATATION AND CURETTAGE WITH SUCTION;  Surgeon: Jono Nguyễn MD;  Location: [facility];  Service: Obstetrics/Gynecology;  Laterality: N/A;    TUBAL ABDOMINAL LIGATION         Family History: Her family history is not on file.     Social History: She  reports that she has been smoking cigarettes. She started smoking about 35 years ago. She has a 35.1 pack-year smoking history. She has been exposed to tobacco smoke. She has never used smokeless tobacco. She reports that she does not currently use alcohol. She reports that she does not use drugs.    Home Medications:  Vitamin D (Cholecalciferol), dilTIAZem CD, hydrALAZINE, pregabalin, sertraline, sevelamer, and sodium-potassium-magnesium sulfates    Current Medications:    fentaNYL citrate (PF)    heparin (porcine)    lidocaine 1% - EPINEPHrine [id number]    midazolam    sodium chloride     Allergies:  Allergies   Allergen Reactions    Augmentin [Amoxicillin-Pot Clavulanate] Itching and Swelling     Swelling of tongue       Review of Systems    IR Procedure pertinent significant findings are mentioned in the PMH and PSH above.    Objective     Visit Vitals  /78 (BP Location: Left  "arm)   Pulse 94   Temp 98.2 °F (36.8 °C)   Resp 18   Ht 167.6 cm (66\")   Wt 103 kg (228 lb)   SpO2 96%   BMI 36.80 kg/m²        Physical Exam    A&Ox3.   Able to communicate  No Apparent Distress  Average physique   CVS: VS as noted. Chart reviewed. Stable for the procedure.  Respiratory: Non labored breathing. No signs of respiratory compromise.    Result Review      I have personally reviewed the results from the time of this admission to 1/23/2025 11:14 EST and agree with these findings.  [x]  Laboratory  []  Microbiology  [x]  Radiology  []  EKG/Telemetry   []  Cardiology/Vascular   []  Pathology  []  Old records  []  Other:    Most notable findings include: As noted:    Results from last 7 days   Lab Units 01/23/25  0841   INR  1.01   WBC 10*3/mm3 6.67   HEMOGLOBIN g/dL 12.1   HEMATOCRIT % 36.3   PLATELETS 10*3/mm3 208       Results from last 7 days   Lab Units 01/23/25  0841   SODIUM mmol/L 141   POTASSIUM mmol/L 6.0*   CHLORIDE mmol/L 107   CO2 mmol/L 22.0   BUN mg/dL 34*   CREATININE mg/dL 5.31*   EGFR mL/min/1.73 9.2*   GLUCOSE mg/dL 81             Estimated Creatinine Clearance: 15.2 mL/min (A) (by C-G formula based on SCr of 5.31 mg/dL (H)).   Creatinine   Date Value Ref Range Status   01/23/2025 5.31 (H) 0.57 - 1.00 mg/dL Final       No results found for: \"COVID19\"     Lab Results   Component Value Date    PREGTESTUR Negative 07/21/2023        ASA SCALE ASSESSMENT (applicable ONLY if sedation planned):   3     MALLAMPATI CLASSIFICATION (applicable ONLY if sedation planned):   2    Assessment / Plan     Ian Romero is a 49 y.o. female referred to the IR service with above problem.    Plan:   As above.    Notice: The note was created before the performance of the procedure. It might have been left in the pending status and signed off after the procedure. The time stamp on the note may be misleading.    Akira Morrow MD   Vascular Interventional Radiology  01/23/25   11:14 AM EST     "

## 2025-01-23 NOTE — POST-PROCEDURE NOTE
The following procedure was performed: TDC placement    Please see corresponding Radiology report for in detail procedural information. The Radiology report will be dictated shortly, if not done so already. Please see the IR RN note for the information regarding medicines administered if any, marivel-procedural vitals and I/O information.

## 2025-01-23 NOTE — NURSING NOTE
Discharged home with friend s/p tunneled dialysis access placement, dressing to right chest D/I, VSS, pt denies pain, -Dr Cohn aware of labs, aware of elevated potassium- one dose of lokalmia given prior to D/C and Dr Cohn called in Rx for lokalmia to her pharmacy, Dr Cohn's office will call to schedule her dialysis, pt verbalized understanding

## 2025-01-24 ENCOUNTER — TELEPHONE (OUTPATIENT)
Dept: INFUSION THERAPY | Facility: HOSPITAL | Age: 50
End: 2025-01-24

## (undated) DEVICE — SCRB SURG BACTOSHIELD CHG 4PCT 4OZ

## (undated) DEVICE — SUT VIC 3/0 SH 27IN J416H

## (undated) DEVICE — ST COL BERKELY TBG

## (undated) DEVICE — DRSNG TELFA PAD NONADH STR 1S 3X8IN

## (undated) DEVICE — CANSTR SXN UTER NO FLTR SURG

## (undated) DEVICE — LEX D AND C: Brand: MEDLINE INDUSTRIES, INC.

## (undated) DEVICE — GLV SURG SIGNATURE ESSENTIAL PF LTX SZ7.5

## (undated) DEVICE — ANTIBACTERIAL UNDYED BRAIDED (POLYGLACTIN 910), SYNTHETIC ABSORBABLE SUTURE: Brand: COATED VICRYL

## (undated) DEVICE — TBG W FLTR FOR BERKELEY/SYS EA/10

## (undated) DEVICE — TRAP TISS EA/10

## (undated) DEVICE — STRAP STIRUP WO/RNG 19X3.5IN DISP